# Patient Record
Sex: MALE | Race: OTHER | HISPANIC OR LATINO
[De-identification: names, ages, dates, MRNs, and addresses within clinical notes are randomized per-mention and may not be internally consistent; named-entity substitution may affect disease eponyms.]

---

## 2017-01-26 ENCOUNTER — RECORD ABSTRACTING (OUTPATIENT)
Age: 72
End: 2017-01-26

## 2017-01-26 DIAGNOSIS — Z78.9 OTHER SPECIFIED HEALTH STATUS: ICD-10-CM

## 2017-01-26 DIAGNOSIS — K52.9 NONINFECTIVE GASTROENTERITIS AND COLITIS, UNSPECIFIED: ICD-10-CM

## 2017-01-26 DIAGNOSIS — K57.90 DIVERTICULOSIS OF INTESTINE, PART UNSPECIFIED, W/OUT PERFORATION OR ABSCESS W/OUT BLEEDING: ICD-10-CM

## 2017-01-26 PROBLEM — Z00.00 ENCOUNTER FOR PREVENTIVE HEALTH EXAMINATION: Status: ACTIVE | Noted: 2017-01-26

## 2022-07-01 ENCOUNTER — INPATIENT (INPATIENT)
Facility: HOSPITAL | Age: 77
LOS: 5 days | Discharge: HOME HEALTH | End: 2022-07-07
Attending: INTERNAL MEDICINE | Admitting: INTERNAL MEDICINE

## 2022-07-01 VITALS
TEMPERATURE: 98 F | HEART RATE: 75 BPM | HEIGHT: 62 IN | WEIGHT: 160.06 LBS | SYSTOLIC BLOOD PRESSURE: 170 MMHG | OXYGEN SATURATION: 98 % | RESPIRATION RATE: 18 BRPM | DIASTOLIC BLOOD PRESSURE: 84 MMHG

## 2022-07-01 PROCEDURE — 99285 EMERGENCY DEPT VISIT HI MDM: CPT

## 2022-07-01 RX ORDER — TETANUS TOXOID, REDUCED DIPHTHERIA TOXOID AND ACELLULAR PERTUSSIS VACCINE, ADSORBED 5; 2.5; 8; 8; 2.5 [IU]/.5ML; [IU]/.5ML; UG/.5ML; UG/.5ML; UG/.5ML
0.5 SUSPENSION INTRAMUSCULAR ONCE
Refills: 0 | Status: COMPLETED | OUTPATIENT
Start: 2022-07-01 | End: 2022-07-01

## 2022-07-02 LAB
A1C WITH ESTIMATED AVERAGE GLUCOSE RESULT: 8.1 % — HIGH (ref 4–5.6)
A1C WITH ESTIMATED AVERAGE GLUCOSE RESULT: 8.2 % — HIGH (ref 4–5.6)
ALBUMIN SERPL ELPH-MCNC: 4.5 G/DL — SIGNIFICANT CHANGE UP (ref 3.5–5.2)
ALP SERPL-CCNC: 115 U/L — SIGNIFICANT CHANGE UP (ref 30–115)
ALT FLD-CCNC: 37 U/L — SIGNIFICANT CHANGE UP (ref 0–41)
ANION GAP SERPL CALC-SCNC: 10 MMOL/L — SIGNIFICANT CHANGE UP (ref 7–14)
ANION GAP SERPL CALC-SCNC: 13 MMOL/L — SIGNIFICANT CHANGE UP (ref 7–14)
APTT BLD: 32.5 SEC — SIGNIFICANT CHANGE UP (ref 27–39.2)
AST SERPL-CCNC: 38 U/L — SIGNIFICANT CHANGE UP (ref 0–41)
BASOPHILS # BLD AUTO: 0.05 K/UL — SIGNIFICANT CHANGE UP (ref 0–0.2)
BASOPHILS NFR BLD AUTO: 1 % — SIGNIFICANT CHANGE UP (ref 0–1)
BILIRUB SERPL-MCNC: 0.7 MG/DL — SIGNIFICANT CHANGE UP (ref 0.2–1.2)
BLD GP AB SCN SERPL QL: SIGNIFICANT CHANGE UP
BUN SERPL-MCNC: 14 MG/DL — SIGNIFICANT CHANGE UP (ref 10–20)
BUN SERPL-MCNC: 21 MG/DL — HIGH (ref 10–20)
CALCIUM SERPL-MCNC: 8.7 MG/DL — SIGNIFICANT CHANGE UP (ref 8.5–10.1)
CALCIUM SERPL-MCNC: 8.8 MG/DL — SIGNIFICANT CHANGE UP (ref 8.5–10.1)
CHLORIDE SERPL-SCNC: 97 MMOL/L — LOW (ref 98–110)
CHLORIDE SERPL-SCNC: 99 MMOL/L — SIGNIFICANT CHANGE UP (ref 98–110)
CO2 SERPL-SCNC: 25 MMOL/L — SIGNIFICANT CHANGE UP (ref 17–32)
CO2 SERPL-SCNC: 29 MMOL/L — SIGNIFICANT CHANGE UP (ref 17–32)
CREAT SERPL-MCNC: 0.7 MG/DL — SIGNIFICANT CHANGE UP (ref 0.7–1.5)
CREAT SERPL-MCNC: 0.8 MG/DL — SIGNIFICANT CHANGE UP (ref 0.7–1.5)
CRP SERPL-MCNC: 16 MG/L — HIGH
EGFR: 92 ML/MIN/1.73M2 — SIGNIFICANT CHANGE UP
EGFR: 95 ML/MIN/1.73M2 — SIGNIFICANT CHANGE UP
EOSINOPHIL # BLD AUTO: 0.24 K/UL — SIGNIFICANT CHANGE UP (ref 0–0.7)
EOSINOPHIL NFR BLD AUTO: 4.6 % — SIGNIFICANT CHANGE UP (ref 0–8)
ERYTHROCYTE [SEDIMENTATION RATE] IN BLOOD: 20 MM/HR — HIGH (ref 0–10)
ESTIMATED AVERAGE GLUCOSE: 186 MG/DL — HIGH (ref 68–114)
ESTIMATED AVERAGE GLUCOSE: 189 MG/DL — HIGH (ref 68–114)
GLUCOSE BLDC GLUCOMTR-MCNC: 125 MG/DL — HIGH (ref 70–99)
GLUCOSE BLDC GLUCOMTR-MCNC: 175 MG/DL — HIGH (ref 70–99)
GLUCOSE BLDC GLUCOMTR-MCNC: 188 MG/DL — HIGH (ref 70–99)
GLUCOSE SERPL-MCNC: 201 MG/DL — HIGH (ref 70–99)
GLUCOSE SERPL-MCNC: 341 MG/DL — HIGH (ref 70–99)
HCT VFR BLD CALC: 37.4 % — LOW (ref 42–52)
HCT VFR BLD CALC: 38.4 % — LOW (ref 42–52)
HGB BLD-MCNC: 13.2 G/DL — LOW (ref 14–18)
HGB BLD-MCNC: 13.7 G/DL — LOW (ref 14–18)
IMM GRANULOCYTES NFR BLD AUTO: 1.3 % — HIGH (ref 0.1–0.3)
INR BLD: 1.12 RATIO — SIGNIFICANT CHANGE UP (ref 0.65–1.3)
LYMPHOCYTES # BLD AUTO: 0.83 K/UL — LOW (ref 1.2–3.4)
LYMPHOCYTES # BLD AUTO: 15.8 % — LOW (ref 20.5–51.1)
MAGNESIUM SERPL-MCNC: 1.9 MG/DL — SIGNIFICANT CHANGE UP (ref 1.8–2.4)
MCHC RBC-ENTMCNC: 35 PG — HIGH (ref 27–31)
MCHC RBC-ENTMCNC: 35 PG — HIGH (ref 27–31)
MCHC RBC-ENTMCNC: 35.3 G/DL — SIGNIFICANT CHANGE UP (ref 32–37)
MCHC RBC-ENTMCNC: 35.7 G/DL — SIGNIFICANT CHANGE UP (ref 32–37)
MCV RBC AUTO: 98.2 FL — HIGH (ref 80–94)
MCV RBC AUTO: 99.2 FL — HIGH (ref 80–94)
MONOCYTES # BLD AUTO: 0.87 K/UL — HIGH (ref 0.1–0.6)
MONOCYTES NFR BLD AUTO: 16.6 % — HIGH (ref 1.7–9.3)
NEUTROPHILS # BLD AUTO: 3.18 K/UL — SIGNIFICANT CHANGE UP (ref 1.4–6.5)
NEUTROPHILS NFR BLD AUTO: 60.7 % — SIGNIFICANT CHANGE UP (ref 42.2–75.2)
NRBC # BLD: 0 /100 WBCS — SIGNIFICANT CHANGE UP (ref 0–0)
NRBC # BLD: 0 /100 WBCS — SIGNIFICANT CHANGE UP (ref 0–0)
PLATELET # BLD AUTO: 182 K/UL — SIGNIFICANT CHANGE UP (ref 130–400)
PLATELET # BLD AUTO: 192 K/UL — SIGNIFICANT CHANGE UP (ref 130–400)
POTASSIUM SERPL-MCNC: 3.9 MMOL/L — SIGNIFICANT CHANGE UP (ref 3.5–5)
POTASSIUM SERPL-MCNC: 3.9 MMOL/L — SIGNIFICANT CHANGE UP (ref 3.5–5)
POTASSIUM SERPL-SCNC: 3.9 MMOL/L — SIGNIFICANT CHANGE UP (ref 3.5–5)
POTASSIUM SERPL-SCNC: 3.9 MMOL/L — SIGNIFICANT CHANGE UP (ref 3.5–5)
PROT SERPL-MCNC: 7.1 G/DL — SIGNIFICANT CHANGE UP (ref 6–8)
PROTHROM AB SERPL-ACNC: 12.9 SEC — HIGH (ref 9.95–12.87)
RBC # BLD: 3.77 M/UL — LOW (ref 4.7–6.1)
RBC # BLD: 3.91 M/UL — LOW (ref 4.7–6.1)
RBC # FLD: 12.2 % — SIGNIFICANT CHANGE UP (ref 11.5–14.5)
RBC # FLD: 12.3 % — SIGNIFICANT CHANGE UP (ref 11.5–14.5)
SARS-COV-2 RNA SPEC QL NAA+PROBE: SIGNIFICANT CHANGE UP
SODIUM SERPL-SCNC: 135 MMOL/L — SIGNIFICANT CHANGE UP (ref 135–146)
SODIUM SERPL-SCNC: 138 MMOL/L — SIGNIFICANT CHANGE UP (ref 135–146)
WBC # BLD: 5.24 K/UL — SIGNIFICANT CHANGE UP (ref 4.8–10.8)
WBC # BLD: 7.26 K/UL — SIGNIFICANT CHANGE UP (ref 4.8–10.8)
WBC # FLD AUTO: 5.24 K/UL — SIGNIFICANT CHANGE UP (ref 4.8–10.8)
WBC # FLD AUTO: 7.26 K/UL — SIGNIFICANT CHANGE UP (ref 4.8–10.8)

## 2022-07-02 PROCEDURE — 99497 ADVNCD CARE PLAN 30 MIN: CPT | Mod: 25

## 2022-07-02 PROCEDURE — 73552 X-RAY EXAM OF FEMUR 2/>: CPT | Mod: 26,RT

## 2022-07-02 PROCEDURE — 99223 1ST HOSP IP/OBS HIGH 75: CPT

## 2022-07-02 PROCEDURE — 73590 X-RAY EXAM OF LOWER LEG: CPT | Mod: 26,RT

## 2022-07-02 PROCEDURE — 73630 X-RAY EXAM OF FOOT: CPT | Mod: 26,RT

## 2022-07-02 PROCEDURE — 73610 X-RAY EXAM OF ANKLE: CPT | Mod: 26,RT

## 2022-07-02 PROCEDURE — 73562 X-RAY EXAM OF KNEE 3: CPT | Mod: 26,RT

## 2022-07-02 PROCEDURE — 73706 CT ANGIO LWR EXTR W/O&W/DYE: CPT | Mod: 26,RT,MA

## 2022-07-02 RX ORDER — DEXTROSE 50 % IN WATER 50 %
12.5 SYRINGE (ML) INTRAVENOUS ONCE
Refills: 0 | Status: DISCONTINUED | OUTPATIENT
Start: 2022-07-02 | End: 2022-07-05

## 2022-07-02 RX ORDER — FINASTERIDE 5 MG/1
5 TABLET, FILM COATED ORAL DAILY
Refills: 0 | Status: DISCONTINUED | OUTPATIENT
Start: 2022-07-02 | End: 2022-07-07

## 2022-07-02 RX ORDER — INSULIN LISPRO 100/ML
VIAL (ML) SUBCUTANEOUS
Refills: 0 | Status: DISCONTINUED | OUTPATIENT
Start: 2022-07-02 | End: 2022-07-05

## 2022-07-02 RX ORDER — CEFAZOLIN SODIUM 1 G
500 VIAL (EA) INJECTION EVERY 8 HOURS
Refills: 0 | Status: DISCONTINUED | OUTPATIENT
Start: 2022-07-02 | End: 2022-07-02

## 2022-07-02 RX ORDER — ONDANSETRON 8 MG/1
4 TABLET, FILM COATED ORAL EVERY 8 HOURS
Refills: 0 | Status: DISCONTINUED | OUTPATIENT
Start: 2022-07-02 | End: 2022-07-07

## 2022-07-02 RX ORDER — LANOLIN ALCOHOL/MO/W.PET/CERES
3 CREAM (GRAM) TOPICAL AT BEDTIME
Refills: 0 | Status: DISCONTINUED | OUTPATIENT
Start: 2022-07-02 | End: 2022-07-07

## 2022-07-02 RX ORDER — DEXTROSE 50 % IN WATER 50 %
25 SYRINGE (ML) INTRAVENOUS ONCE
Refills: 0 | Status: DISCONTINUED | OUTPATIENT
Start: 2022-07-02 | End: 2022-07-05

## 2022-07-02 RX ORDER — ACETAMINOPHEN 500 MG
975 TABLET ORAL ONCE
Refills: 0 | Status: COMPLETED | OUTPATIENT
Start: 2022-07-02 | End: 2022-07-02

## 2022-07-02 RX ORDER — METRONIDAZOLE 500 MG
500 TABLET ORAL ONCE
Refills: 0 | Status: COMPLETED | OUTPATIENT
Start: 2022-07-02 | End: 2022-07-02

## 2022-07-02 RX ORDER — CEFAZOLIN SODIUM 1 G
1000 VIAL (EA) INJECTION EVERY 8 HOURS
Refills: 0 | Status: DISCONTINUED | OUTPATIENT
Start: 2022-07-02 | End: 2022-07-07

## 2022-07-02 RX ORDER — HEPARIN SODIUM 5000 [USP'U]/ML
5000 INJECTION INTRAVENOUS; SUBCUTANEOUS EVERY 8 HOURS
Refills: 0 | Status: DISCONTINUED | OUTPATIENT
Start: 2022-07-02 | End: 2022-07-07

## 2022-07-02 RX ORDER — ATORVASTATIN CALCIUM 80 MG/1
10 TABLET, FILM COATED ORAL AT BEDTIME
Refills: 0 | Status: DISCONTINUED | OUTPATIENT
Start: 2022-07-02 | End: 2022-07-07

## 2022-07-02 RX ORDER — SODIUM CHLORIDE 9 MG/ML
1000 INJECTION, SOLUTION INTRAVENOUS
Refills: 0 | Status: DISCONTINUED | OUTPATIENT
Start: 2022-07-02 | End: 2022-07-05

## 2022-07-02 RX ORDER — ACETAMINOPHEN 500 MG
650 TABLET ORAL EVERY 6 HOURS
Refills: 0 | Status: DISCONTINUED | OUTPATIENT
Start: 2022-07-02 | End: 2022-07-07

## 2022-07-02 RX ORDER — DEXTROSE 50 % IN WATER 50 %
15 SYRINGE (ML) INTRAVENOUS ONCE
Refills: 0 | Status: DISCONTINUED | OUTPATIENT
Start: 2022-07-02 | End: 2022-07-05

## 2022-07-02 RX ORDER — CEFTRIAXONE 500 MG/1
2000 INJECTION, POWDER, FOR SOLUTION INTRAMUSCULAR; INTRAVENOUS ONCE
Refills: 0 | Status: COMPLETED | OUTPATIENT
Start: 2022-07-02 | End: 2022-07-02

## 2022-07-02 RX ORDER — TAMSULOSIN HYDROCHLORIDE 0.4 MG/1
0.4 CAPSULE ORAL AT BEDTIME
Refills: 0 | Status: DISCONTINUED | OUTPATIENT
Start: 2022-07-02 | End: 2022-07-07

## 2022-07-02 RX ORDER — LISINOPRIL 2.5 MG/1
10 TABLET ORAL DAILY
Refills: 0 | Status: DISCONTINUED | OUTPATIENT
Start: 2022-07-02 | End: 2022-07-07

## 2022-07-02 RX ORDER — INSULIN GLARGINE 100 [IU]/ML
15 INJECTION, SOLUTION SUBCUTANEOUS AT BEDTIME
Refills: 0 | Status: DISCONTINUED | OUTPATIENT
Start: 2022-07-02 | End: 2022-07-05

## 2022-07-02 RX ORDER — GLUCAGON INJECTION, SOLUTION 0.5 MG/.1ML
1 INJECTION, SOLUTION SUBCUTANEOUS ONCE
Refills: 0 | Status: DISCONTINUED | OUTPATIENT
Start: 2022-07-02 | End: 2022-07-05

## 2022-07-02 RX ORDER — INSULIN LISPRO 100/ML
5 VIAL (ML) SUBCUTANEOUS
Refills: 0 | Status: DISCONTINUED | OUTPATIENT
Start: 2022-07-02 | End: 2022-07-05

## 2022-07-02 RX ORDER — TRIAMTERENE/HYDROCHLOROTHIAZID 75 MG-50MG
1 TABLET ORAL DAILY
Refills: 0 | Status: DISCONTINUED | OUTPATIENT
Start: 2022-07-02 | End: 2022-07-07

## 2022-07-02 RX ORDER — TETANUS TOXOID, REDUCED DIPHTHERIA TOXOID AND ACELLULAR PERTUSSIS VACCINE, ADSORBED 5; 2.5; 8; 8; 2.5 [IU]/.5ML; [IU]/.5ML; UG/.5ML; UG/.5ML; UG/.5ML
0.5 SUSPENSION INTRAMUSCULAR ONCE
Refills: 0 | Status: DISCONTINUED | OUTPATIENT
Start: 2022-07-02 | End: 2022-07-02

## 2022-07-02 RX ADMIN — Medication 1: at 17:11

## 2022-07-02 RX ADMIN — HEPARIN SODIUM 5000 UNIT(S): 5000 INJECTION INTRAVENOUS; SUBCUTANEOUS at 22:10

## 2022-07-02 RX ADMIN — Medication 100 MILLIGRAM(S): at 03:35

## 2022-07-02 RX ADMIN — ATORVASTATIN CALCIUM 10 MILLIGRAM(S): 80 TABLET, FILM COATED ORAL at 22:11

## 2022-07-02 RX ADMIN — HEPARIN SODIUM 5000 UNIT(S): 5000 INJECTION INTRAVENOUS; SUBCUTANEOUS at 17:14

## 2022-07-02 RX ADMIN — Medication 1: at 12:00

## 2022-07-02 RX ADMIN — Medication 650 MILLIGRAM(S): at 14:37

## 2022-07-02 RX ADMIN — INSULIN GLARGINE 15 UNIT(S): 100 INJECTION, SOLUTION SUBCUTANEOUS at 22:11

## 2022-07-02 RX ADMIN — TETANUS TOXOID, REDUCED DIPHTHERIA TOXOID AND ACELLULAR PERTUSSIS VACCINE, ADSORBED 0.5 MILLILITER(S): 5; 2.5; 8; 8; 2.5 SUSPENSION INTRAMUSCULAR at 01:35

## 2022-07-02 RX ADMIN — Medication 5 UNIT(S): at 12:00

## 2022-07-02 RX ADMIN — TAMSULOSIN HYDROCHLORIDE 0.4 MILLIGRAM(S): 0.4 CAPSULE ORAL at 22:10

## 2022-07-02 RX ADMIN — Medication 100 MILLIGRAM(S): at 07:03

## 2022-07-02 RX ADMIN — Medication 650 MILLIGRAM(S): at 15:14

## 2022-07-02 RX ADMIN — Medication 975 MILLIGRAM(S): at 02:10

## 2022-07-02 RX ADMIN — CEFTRIAXONE 100 MILLIGRAM(S): 500 INJECTION, POWDER, FOR SOLUTION INTRAMUSCULAR; INTRAVENOUS at 03:35

## 2022-07-02 RX ADMIN — FINASTERIDE 5 MILLIGRAM(S): 5 TABLET, FILM COATED ORAL at 17:13

## 2022-07-02 RX ADMIN — Medication 5 UNIT(S): at 17:21

## 2022-07-02 RX ADMIN — Medication 975 MILLIGRAM(S): at 01:35

## 2022-07-02 RX ADMIN — Medication 100 MILLIGRAM(S): at 22:10

## 2022-07-02 RX ADMIN — Medication 100 MILLIGRAM(S): at 14:02

## 2022-07-02 NOTE — CONSULT NOTE ADULT - SUBJECTIVE AND OBJECTIVE BOX
SHAILA LAL  76y, Male  Allergy: No Known Allergies      CHIEF COMPLAINT: Cellulitis (02 Jul 2022 13:55)      LOS      HPI:  77 yo male   PMH :  pre-diabetes , HT   Presenting for R foot pain that has been constant for the past 3 days.   States he fell on the sidewalk and injured his R knee and foot 3 days ago. It was a mechanical fall with no LOC, dizziness   Went to an ED at [facility] in [facility], had xrays done at the time, states they were negative and was given oral antibiotics  States that the swelling and discoloration to the foot have worsened since then.   Denies : fevers, previous infections, hx of known vascular disease    In the ED  Bp : 170/85  HR : 75   RR: 18  T : 97.9  O2 : 98% on RA    Gluc : 341 , WBC 5    CT Angio LE R :   Intact arterial flow/triple-vessel runoff to the right lower extremity  without evidence for significant stenosis or acute injury.  Diffuse subcutaneous soft tissue edema and areas of skin thickening  extending from the level of the distal femur through the foot - possible cellulitis   Focal 5.0 x 1.57 m hyperdense attenuation over the dorsum mid to hindfoot - possible hematoma   No definite acute osseous abnormality is identified.    Started on Ancef 1g q8 in ED    Patient pharmacy : [facility] [address] [facility] : closed on the weekend , reopens Monday 9am-6pm ([phone])  Please call to confirm med recc : Per patient he takes 1bp med and 1 water pill, can not remember the names          (02 Jul 2022 10:13)      INFECTIOUS DISEASE HISTORY:    PAST MEDICAL & SURGICAL HISTORY:  Prediabetes          FAMILY HISTORY  History as above     SOCIAL HISTORY  Social History:  No smoking   No Alcohol use   No illicit drugs (02 Jul 2022 10:13)        ROS  General: Denies rigors, nightsweats  HEENT: Denies headache, rhinorrhea, sore throat, eye pain  CV: Denies CP, palpitations  PULM: Denies wheezing, hemoptysis  GI: Denies hematemesis, hematochezia, melena  : Denies discharge, hematuria  MSK: Denies arthralgias, myalgias  SKIN: Denies rash, lesions  NEURO: Denies paresthesias, weakness  PSYCH: Denies depression, anxiety    VITALS:  T(F): 99.3, Max: 99.3 (07-02-22 @ 12:10)  HR: 68  BP: 153/73  RR: 18Vital Signs Last 24 Hrs  T(C): 37.4 (02 Jul 2022 12:10), Max: 37.4 (02 Jul 2022 12:10)  T(F): 99.3 (02 Jul 2022 12:10), Max: 99.3 (02 Jul 2022 12:10)  HR: 68 (02 Jul 2022 12:10) (63 - 75)  BP: 153/73 (02 Jul 2022 12:10) (132/73 - 170/84)  BP(mean): --  RR: 18 (02 Jul 2022 12:10) (18 - 18)  SpO2: 98% (02 Jul 2022 03:19) (98% - 98%)    PHYSICAL EXAM:  Gen: NAD, resting in bed  HEENT: Normocephalic, atraumatic  Neck: supple, no lymphadenopathy  CV: Regular rate & regular rhythm  Lungs: decreased BS at bases, no fremitus  Abdomen: Soft, BS present  Ext: Warm, well perfused  Neuro: non focal, awake  Skin: no rash, no erythema  Lines: no phlebitis    TESTS & MEASUREMENTS:                        13.7   7.26  )-----------( 192      ( 02 Jul 2022 12:04 )             38.4     07-02    138  |  99  |  14  ----------------------------<  201<H>  3.9   |  29  |  0.7    Ca    8.8      02 Jul 2022 12:04  Mg     1.9     07-02    TPro  7.1  /  Alb  4.5  /  TBili  0.7  /  DBili  x   /  AST  38  /  ALT  37  /  AlkPhos  115  07-02      LIVER FUNCTIONS - ( 02 Jul 2022 00:26 )  Alb: 4.5 g/dL / Pro: 7.1 g/dL / ALK PHOS: 115 U/L / ALT: 37 U/L / AST: 38 U/L / GGT: x                     INFECTIOUS DISEASES TESTING  COVID-19 PCR: NotDetec (07-02-22 @ 02:49)      RADIOLOGY & ADDITIONAL TESTS:  I have personally reviewed the last Chest xray  CXR      CT      CARDIOLOGY TESTING      MEDICATIONS  atorvastatin 10 Oral at bedtime  ceFAZolin   IVPB 1000 IV Intermittent every 8 hours  dextrose 5%. 1000 IV Continuous <Continuous>  dextrose 5%. 1000 IV Continuous <Continuous>  dextrose 50% Injectable 25 IV Push once  dextrose 50% Injectable 12.5 IV Push once  dextrose 50% Injectable 25 IV Push once  finasteride 5 Oral daily  glucagon  Injectable 1 IntraMuscular once  heparin   Injectable 5000 SubCutaneous every 8 hours  insulin glargine Injectable (LANTUS) 15 SubCutaneous at bedtime  insulin lispro (ADMELOG) corrective regimen sliding scale  SubCutaneous three times a day before meals  insulin lispro Injectable (ADMELOG) 5 SubCutaneous three times a day before meals  lisinopril 10 Oral daily  tamsulosin 0.4 Oral at bedtime  triamterene 37.5 mG/hydrochlorothiazide 25 mG Tablet 1 Oral daily      Weight  Weight (kg): 72.6 (07-01-22 @ 22:14)    ANTIBIOTICS:  ceFAZolin   IVPB 1000 milliGRAM(s) IV Intermittent every 8 hours      ALLERGIES:  No Known Allergies         HALI SHAILA  76y, Male  Allergy: No Known Allergies      CHIEF COMPLAINT: Cellulitis (02 Jul 2022 13:55)      LOS      HPI:  77 yo male   PMH :  pre-diabetes , HT   Presenting for R foot pain that has been constant for the past 3 days.   States he fell on the sidewalk and injured his R knee and foot 3 days ago. It was a mechanical fall with no LOC, dizziness   Went to an ED at [facility] in [facility], had xrays done at the time, states they were negative and was given oral antibiotics  States that the swelling and discoloration to the foot have worsened since then.   Denies : fevers, previous infections, hx of known vascular disease    In the ED  Bp : 170/85  HR : 75   RR: 18  T : 97.9  O2 : 98% on RA    Gluc : 341 , WBC 5    CT Angio LE R :   Intact arterial flow/triple-vessel runoff to the right lower extremity  without evidence for significant stenosis or acute injury.  Diffuse subcutaneous soft tissue edema and areas of skin thickening  extending from the level of the distal femur through the foot - possible cellulitis   Focal 5.0 x 1.57 m hyperdense attenuation over the dorsum mid to hindfoot - possible hematoma   No definite acute osseous abnormality is identified.    Started on Ancef 1g q8 in ED    Patient pharmacy : [facility] [address] [facility] : closed on the weekend , reopens Monday 9am-6pm ([phone])  Please call to confirm med recc : Per patient he takes 1bp med and 1 water pill, can not remember the names          (02 Jul 2022 10:13)      INFECTIOUS DISEASE HISTORY:  History as above.   Reports that recently at [facility] for similar issue.   He was given short course of antibiotics.,  Presents with worsening swelling/pain     PAST MEDICAL & SURGICAL HISTORY:  Prediabetes          FAMILY HISTORY  History as above     SOCIAL HISTORY  Social History:  No smoking   No Alcohol use   No illicit drugs (02 Jul 2022 10:13)        ROS  General: Denies rigors, nightsweats  HEENT: Denies headache, rhinorrhea, sore throat, eye pain  CV: Denies CP, palpitations  PULM: Denies wheezing, hemoptysis  GI: Denies hematemesis, hematochezia, melena  : Denies discharge, hematuria  MSK: Denies arthralgias, myalgias  SKIN: Denies rash, lesions  NEURO: Denies paresthesias, weakness  PSYCH: Denies depression, anxiety    VITALS:  T(F): 99.3, Max: 99.3 (07-02-22 @ 12:10)  HR: 68  BP: 153/73  RR: 18Vital Signs Last 24 Hrs  T(C): 37.4 (02 Jul 2022 12:10), Max: 37.4 (02 Jul 2022 12:10)  T(F): 99.3 (02 Jul 2022 12:10), Max: 99.3 (02 Jul 2022 12:10)  HR: 68 (02 Jul 2022 12:10) (63 - 75)  BP: 153/73 (02 Jul 2022 12:10) (132/73 - 170/84)  BP(mean): --  RR: 18 (02 Jul 2022 12:10) (18 - 18)  SpO2: 98% (02 Jul 2022 03:19) (98% - 98%)    PHYSICAL EXAM:  Gen: NAD, resting in bed  HEENT: Normocephalic, atraumatic  Neck: supple, no lymphadenopathy  CV: Regular rate & regular rhythm  Lungs: decreased BS at bases, no fremitus  Abdomen: Soft, BS present  Ext: Warm, well perfused: RLE with brusing up lateral leg - dark/purpuble with likely blood filled blisters at dorsal, lateral aspect of foot, edematous   Neuro: non focal, awake  Skin: no rash, no erythema  Lines: no phlebitis    TESTS & MEASUREMENTS:                        13.7   7.26  )-----------( 192      ( 02 Jul 2022 12:04 )             38.4     07-02    138  |  99  |  14  ----------------------------<  201<H>  3.9   |  29  |  0.7    Ca    8.8      02 Jul 2022 12:04  Mg     1.9     07-02    TPro  7.1  /  Alb  4.5  /  TBili  0.7  /  DBili  x   /  AST  38  /  ALT  37  /  AlkPhos  115  07-02      LIVER FUNCTIONS - ( 02 Jul 2022 00:26 )  Alb: 4.5 g/dL / Pro: 7.1 g/dL / ALK PHOS: 115 U/L / ALT: 37 U/L / AST: 38 U/L / GGT: x                     INFECTIOUS DISEASES TESTING  COVID-19 PCR: NotDetec (07-02-22 @ 02:49)      RADIOLOGY & ADDITIONAL TESTS:  I have personally reviewed the last Chest xray  CXR      CT      CARDIOLOGY TESTING      MEDICATIONS  atorvastatin 10 Oral at bedtime  ceFAZolin   IVPB 1000 IV Intermittent every 8 hours  dextrose 5%. 1000 IV Continuous <Continuous>  dextrose 5%. 1000 IV Continuous <Continuous>  dextrose 50% Injectable 25 IV Push once  dextrose 50% Injectable 12.5 IV Push once  dextrose 50% Injectable 25 IV Push once  finasteride 5 Oral daily  glucagon  Injectable 1 IntraMuscular once  heparin   Injectable 5000 SubCutaneous every 8 hours  insulin glargine Injectable (LANTUS) 15 SubCutaneous at bedtime  insulin lispro (ADMELOG) corrective regimen sliding scale  SubCutaneous three times a day before meals  insulin lispro Injectable (ADMELOG) 5 SubCutaneous three times a day before meals  lisinopril 10 Oral daily  tamsulosin 0.4 Oral at bedtime  triamterene 37.5 mG/hydrochlorothiazide 25 mG Tablet 1 Oral daily      Weight  Weight (kg): 72.6 (07-01-22 @ 22:14)    ANTIBIOTICS:  ceFAZolin   IVPB 1000 milliGRAM(s) IV Intermittent every 8 hours      ALLERGIES:  No Known Allergies

## 2022-07-02 NOTE — H&P ADULT - NSHPLABSRESULTS_GEN_ALL_CORE
(07-02 @ 00:26)                      13.2  5.24 )-----------( 182                 37.4    Neutrophils = 3.18 (60.7%)  Lymphocytes = 0.83 (15.8%)  Eosinophils = 0.24 (4.6%)  Basophils = 0.05 (1.0%)  Monocytes = 0.87 (16.6%)  Bands = --%    07-02    135  |  97<L>  |  21<H>  ----------------------------<  341<H>  3.9   |  25  |  0.8    Ca    8.7      02 Jul 2022 00:26    TPro  7.1  /  Alb  4.5  /  TBili  0.7  /  DBili  x   /  AST  38  /  ALT  37  /  AlkPhos  115  07-02    ( 02 Jul 2022 00:26 )   PT: 12.90 sec;   INR: 1.12 ratio;
Negative

## 2022-07-02 NOTE — ED PROVIDER NOTE - PHYSICAL EXAMINATION
GEN: Non toxic appearing, pt sitting on stretcher in nad.  HEAD: Normocephalic, atraumatic.  Integumentary: No rash. No laceration. R dorsal foot swelling, ecchymoses, superficial lateral abrasions and dorsal abrasion. Bluish discoloration to R foot. DP and PT pulses dopplerable. R lateral thigh and R lateral knee with ecchymoses.  EYES: No periorbital swelling/ecchymoses. PERRRL, EOM intact. No nystagmus. No subconjunctival hemorrhage.   ENT: MMM. No rhinorrhea/otorrhea. No epistaxis,  No septal hematoma. No mastoid ecchymoses. No intraoral bleeding, No loose or cracked teeth, no active bleeding. No malocclusion. No TMJ pain.  NECK: Supple, non-tender, No palpable shelves or step-offs.  BACK: No spinous tenderness. No palpable shelves or step-offs.  Cardiovascular: RRR, radial pulses 2/4 b/l. dp and pt pulses 2/4/ b/l. No pain to palpation to chest wall.  Respiratory: BS present b/l, ctabl, no wheezing or crackles, no stridor. No pain to palpation to ribs b/l. No crepitus. No subq emphysema.   Gastrointestinal: BS present throughout all 4 quadrants, soft, nd, nt. no r/g.  Musculoskeletal: R dorsal foot pain with decreased ROM of R ankle in all motions due to pain, no pain to palpation to R  knee, fibular head, patella, femur, base of metatarsal or tib fib with FROM of knee, hip and toes.   Neurologic: GCS 15. CN II-XII intact, no facial droop or slurring of speech. Motor 5/5 and sensation intact throughout upper and lower extremities.

## 2022-07-02 NOTE — H&P ADULT - NSHPREVIEWOFSYSTEMS_GEN_ALL_CORE

## 2022-07-02 NOTE — H&P ADULT - ATTENDING COMMENTS
76 Y M with pmh of pre-DM, HTN, BPH presents to ED after hurting his right foot on Monday. He says he was walking and tripped, rolled his right ankle/foot, and since then has had significant pain and swelling that is worsening. He went to [facility] in [place], had xrays done (which he says were negative), and given oral antibiotics. However, has had worsening pain and swelling, now with worsening bruising and inability to ambulate. Denies fevers, previous infections, loss of sensation.     #RLE cellulitis, now with bullae and significant soft tissue swelling   -SIRS not present on admission  -continue ancef 1g iv q8h  -awaiting ID consult  -tylenol prn for pain  -distal pulses present  -agree with burn consult   -va duplex RLE venous     # DM - uncontrolled  - f/u finger sticks   - f/u A1c  - home on metformin 1000 qD   - insulin naive   - started on glargine 15 qhs , lispro 5 with meals + SS (low)    # HT   - c/w home hctz 37.5/tramptorere 25 PO qD  - c/w home lisinopril 10 qD    # HLD  - c/w home atorvastatin 10 at bed time     # BPH   - c/w home flomax and finasteride    #DVT PPx- LVX 40mg sq qhs  #GI PPx- None   #Diet- DASH/TLC/CC  #CHG  #Activity- AAT; PT/Rehab   #Dispo- Acute; pending burn/ID consults, improvement of swelling, PT/rehab  #Code- FULL 76 Y M with pmh of pre-DM, HTN, BPH presents to ED after hurting his right foot on Monday. He says he was walking and tripped, rolled his right ankle/foot, and since then has had significant pain and swelling that is worsening. He went to [facility] in [place], had xrays done (which he says were negative), and given oral antibiotics. However, has had worsening pain and swelling, now with worsening bruising and inability to ambulate. Denies fevers, previous infections, loss of sensation.     #RLE cellulitis, now with bullae and significant soft tissue swelling   -SIRS not present on admission  -continue ancef 1g iv q8h  -awaiting ID consult  -tylenol prn for pain  -distal pulses present  -agree with burn consult   -va duplex RLE venous   -send a1c    # DM - uncontrolled  - f/u finger sticks   - f/u A1c  - home on metformin 1000 qD   - insulin naive   - started on glargine 15 qhs , lispro 5 with meals + SS (low)    # HT   - c/w home hctz 37.5/tramptorere 25 PO qD  - c/w home lisinopril 10 qD    # HLD  - c/w home atorvastatin 10 at bed time     # BPH   - c/w home flomax and finasteride    #DVT PPx- LVX 40mg sq qhs  #GI PPx- None   #Diet- DASH/TLC/CC  #CHG  #Activity- AAT; PT/Rehab   #Dispo- Acute; pending burn/ID consults, improvement of swelling, PT/rehab  #Code- FULL

## 2022-07-02 NOTE — PATIENT PROFILE ADULT - 
ADDITIONAL INFORMATION
-Using the intrrrepter [id number] [place] this nurse was told that patient do not know which brand of vaccine he had taken, He received three doses of the vaccine, the last was in february 2021. He does not have the card with him

## 2022-07-02 NOTE — CONSULT NOTE ADULT - ASSESSMENT
ASSESSMENT  77 yo male with PMH of pre-diabetes, HTN who presents with fight foot pain.     IMPRESSION  #Right LE cellulitis - SIRS not present on admission   - CT Angio Lower Extremity w/ IV Cont, Right (07.02.22 @ 01:50): Diffuse subcutaneous soft tissue edema and areas of skin thickening  extending from the level of the distal femur through the foot with more   focal 5.0 x 1.57 m hyperdense attenuation over the dorsum mid to hindfoot  which may reflect underlying hematoma. Clinical correlation would be  needed to exclude any signs of cellulitis or infection. No definite acute osseous abnormality is identified.  - WBC Count: 5.24 K/uL (07.02.22 @ 00:26)    #Abx allergy: NKDA    RECOMMENDATIONS  This is a preliminary incomplete pended note, all final recommendations to follow after interview and examination of the patient.    Please call or message on [employer] Teams if with any questions.  Spectra [phone]   ASSESSMENT  77 yo male with PMH of pre-diabetes, HTN who presents with fight foot pain.     IMPRESSION  #Right LE cellulitis with hematoma - SIRS not present on admission   - CT Angio Lower Extremity w/ IV Cont, Right (07.02.22 @ 01:50): Diffuse subcutaneous soft tissue edema and areas of skin thickening  extending from the level of the distal femur through the foot with more   focal 5.0 x 1.57 m hyperdense attenuation over the dorsum mid to hindfoot  which may reflect underlying hematoma. Clinical correlation would be  needed to exclude any signs of cellulitis or infection. No definite acute osseous abnormality is identified.  - WBC Count: 5.24 K/uL (07.02.22 @ 00:26)    #Abx allergy: NKDA    RECOMMENDATIONS  - clinical presentation and labs not suggestive of necrotizing infectious at this time -- likely cellulitis with hematoma   - Podiatry evaluation to see if this could potentially be evacuated  - continue cefazolin 1g q 8 hours   - monitor wound closely     Please call or message on Microsoft Teams if with any questions.  Spectra [phone]   ASSESSMENT  77 yo male with PMH of pre-diabetes, HTN who presents with fight foot pain.     IMPRESSION  #Right LE cellulitis with hematoma - SIRS not present on admission   - CT Angio Lower Extremity w/ IV Cont, Right (07.02.22 @ 01:50): Diffuse subcutaneous soft tissue edema and areas of skin thickening  extending from the level of the distal femur through the foot with more   focal 5.0 x 1.57 m hyperdense attenuation over the dorsum mid to hindfoot  which may reflect underlying hematoma. Clinical correlation would be  needed to exclude any signs of cellulitis or infection. No definite acute osseous abnormality is identified.  - WBC Count: 5.24 K/uL (07.02.22 @ 00:26)    #Abx allergy: NKDA    RECOMMENDATIONS  - clinical presentation and labs not suggestive of necrotizing infectious at this time -- likely cellulitis with hematoma   - agree with burn evaluation to see if this could potentially be evacuated  - continue cefazolin 1g q 8 hours   - monitor wound closely     Please call or message on [employer] Teams if with any questions.  Spectra [phone]

## 2022-07-02 NOTE — ED PROVIDER NOTE - PROGRESS NOTE DETAILS
ED Attending DEISY Millan  Pt and family aware of all results, pt with decreased ambulation, concern for cellulitis, abx ordered, medical admitting team aware of pt and admission.

## 2022-07-02 NOTE — H&P ADULT - HISTORY OF PRESENT ILLNESS
75 yo male   PMH :  pre-diabetes   Presenting for R foot pain that has been constant for the past 3 days.   States he fell and injured his R foot 3 days ago. Went to an ED in the city, had xrays done at the time, states they were negative. States that the swelling and discoloration to the foot have worsened since then. Unsure when he last received tetanus shot.    In the ED  Bp : 170/85  HR : 75   RR: 18  T : 97.9  O2 : 98% on RA    CT Angio LE R :   Intact arterial flow/triple-vessel runoff to the right lower extremity   without evidence for significant stenosis or acute injury.    Diffuse subcutaneous soft tissue edema and areas of skin thickening   extending from the level of the distal femur through the foot with more   focal 5.0 x 1.57 m hyperdense attenuation over the dorsum mid to hindfoot   which may reflect underlying hematoma. Clinical correlation would be   needed to exclude any signs of cellulitis or infection.    No definite acute osseous abnormality is identified.     75 yo male   PMH :  pre-diabetes   Presenting for R foot pain that has been constant for the past 3 days.   States he fell and injured his R foot 3 days ago. Went to an ED in the city, had xrays done at the time, states they were negative. States that the swelling and discoloration to the foot have worsened since then. Unsure when he last received tetanus shot.    In the ED  Bp : 170/85  HR : 75   RR: 18  T : 97.9  O2 : 98% on RA    Gluc : 341 , WBC 5    CT Angio LE R :   Intact arterial flow/triple-vessel runoff to the right lower extremity  without evidence for significant stenosis or acute injury.  Diffuse subcutaneous soft tissue edema and areas of skin thickening  extending from the level of the distal femur through the foot - possible cellulitis   Focal 5.0 x 1.57 m hyperdense attenuation over the dorsum mid to hindfoot - possible hematoma   No definite acute osseous abnormality is identified.    Started in Ancef 1g q8 in ED         77 yo male   PMH :  pre-diabetes , HT   Presenting for R foot pain that has been constant for the past 3 days.   States he fell on the sidewalk and injured his R knee and foot 3 days ago. It was a mechanical fall with no LOC, dizziness   Went to an ED at [facility] in [facility], had xrays done at the time, states they were negative and was given oral antibiotics  States that the swelling and discoloration to the foot have worsened since then.   Denies : fevers, previous infections, hx of known vascular disease    In the ED  Bp : 170/85  HR : 75   RR: 18  T : 97.9  O2 : 98% on RA    Gluc : 341 , WBC 5    CT Angio LE R :   Intact arterial flow/triple-vessel runoff to the right lower extremity  without evidence for significant stenosis or acute injury.  Diffuse subcutaneous soft tissue edema and areas of skin thickening  extending from the level of the distal femur through the foot - possible cellulitis   Focal 5.0 x 1.57 m hyperdense attenuation over the dorsum mid to hindfoot - possible hematoma   No definite acute osseous abnormality is identified.    Started on Ancef 1g q8 in ED    Patient pharmacy : [facility] [address] [facility] : closed on the weekend , reopens Monday 9am-6pm ([phone])  Please call to confirm med recc : Per patient he takes 1bp med and 1 water pill, can not remember the names

## 2022-07-02 NOTE — H&P ADULT - TIME BILLING
Total time spent to complete patient's bedside assessment, physical examination, review medical chart including labs & imaging, discuss medical plan of care with housestaff was more than 35 minutes

## 2022-07-02 NOTE — H&P ADULT - ASSESSMENT
# Cellulitis   -     # DM   -  75 yo male with PMH :  pre-diabetes , HT . Presenting for R foot pain for past 3d after mechanical fall.     # Cellulitis  - WBC : 5 on admission, afebrile   - non painful , purple discoloration , bullae +  - Cefazolin IV 1g q8  - f/u ID cosult reccs  - f/u Burn consult reccs     # DM - uncontrolled  - f/u finger sticks   - f/u A1c  - home on metformin 1000 qD non compliant   - insulin naive   - started on glargine, lispro + SS    # HT   - c/w home hctz 37.5/tramptorere 25 PO qD  - c/w home lisinopril 10 qD    # HLD  - c/w home atorvastatin 10 at bed time     DVT : heparin 5K q8  Diet : Carb consistent , DASH  Activity : Ambulate as barbra, uses cane at baseline, PT eval   Dispo : from home  75 yo male with PMH :  pre-diabetes , HT . Presenting for R foot pain for past 3d after mechanical fall.     # Cellulitis  - WBC : 5 on admission, afebrile   - non painful , purple discoloration , bullae +  - Cefazolin IV 1g q8  - f/u ID cosult reccs  - f/u Burn consult reccs     # DM - uncontrolled  - f/u finger sticks   - f/u A1c  - home on metformin 1000 qD non compliant   - insulin naive   - started on glargine 15 qhs , lispro 5 with meals + SS (low)    # HT   - c/w home hctz 37.5/tramptorere 25 PO qD  - c/w home lisinopril 10 qD    # HLD  - c/w home atorvastatin 10 at bed time     DVT : heparin 5K q8  Diet : Carb consistent , DASH  Activity : Ambulate as barbra, uses cane at baseline, PT eval   Dispo : from home  77 yo male with PMH :  pre-diabetes , HT . Presenting for R foot pain for past 3d after mechanical fall.     # Cellulitis  - WBC : 5 on admission, afebrile   - non painful , purple discoloration , bullae +     - CT Angio LE R :   Intact arterial flow/triple-vessel runoff to the right lower extremity  without evidence for significant stenosis or acute injury.  Diffuse subcutaneous soft tissue edema and areas of skin thickening  extending from the level of the distal femur through the foot - possible cellulitis   Focal 5.0 x 1.57 m hyperdense attenuation over the dorsum mid to hindfoot - possible hematoma   No definite acute osseous abnormality is identified.    - Cefazolin IV 1g q8   - f/u Bcx  - f/u ID cosult reccs for AB adjustment (per ID keep on Cefazolin until eval done)  - f/u Burn consult reccs     # DM - uncontrolled  - f/u finger sticks   - f/u A1c  - home on metformin 1000 qD   - insulin naive   - started on glargine 15 qhs , lispro 5 with meals + SS (low)    # HT   - c/w home hctz 37.5/tramptorere 25 PO qD  - c/w home lisinopril 10 qD    # HLD  - c/w home atorvastatin 10 at bed time     # BPH   - c/w home flomax and finasteride    DVT : heparin 5K q8  Diet : Carb consistent , DASH  Activity : Ambulate as barbra, uses cane at baseline, PT eval   Dispo : from home  77 yo male with PMH :  pre-diabetes , HT . Presenting for R foot pain for past 3d after mechanical fall.     # Cellulitis  - WBC : 5 on admission, afebrile   - non painful , purple discoloration , bullae +     - CT Angio LE R :   Intact arterial flow/triple-vessel runoff to the right lower extremity  without evidence for significant stenosis or acute injury.  Diffuse subcutaneous soft tissue edema and areas of skin thickening  extending from the level of the distal femur through the foot - possible cellulitis   Focal 5.0 x 1.57 m hyperdense attenuation over the dorsum mid to hindfoot - possible hematoma   No definite acute osseous abnormality is identified.    - Cefazolin IV 1g q8   - Pain ; not in pain at rest : Tylenol PRN for now  - f/u Bcx  - f/u ID cosult reccs for AB adjustment (per ID keep on Cefazolin until eval done)  - f/u Burn consult reccs     # DM - uncontrolled  - f/u finger sticks   - f/u A1c  - home on metformin 1000 qD   - insulin naive   - started on glargine 15 qhs , lispro 5 with meals + SS (low)    # HT   - c/w home hctz 37.5/tramptorere 25 PO qD  - c/w home lisinopril 10 qD    # HLD  - c/w home atorvastatin 10 at bed time     # BPH   - c/w home flomax and finasteride    DVT : heparin 5K q8  Diet : Carb consistent , DASH  Activity : Ambulate as barbra, uses cane at baseline, PT eval   Dispo : from home

## 2022-07-02 NOTE — PROGRESS NOTE ADULT - SUBJECTIVE AND OBJECTIVE BOX
T(C): 36.4 (07-02-22 @ 05:40), Max: 36.6 (07-01-22 @ 22:14)  HR: 63 (07-02-22 @ 05:40) (63 - 75)  BP: 132/73 (07-02-22 @ 05:40) (132/73 - 170/84)  RR: 18 (07-02-22 @ 05:40) (18 - 18)  SpO2: 98% (07-02-22 @ 03:19) (98% - 98%)        Physical Medicine And Rehabilitation Services are not indicated in this patient for the following Reason(s):    [    ] Patient is medically unstable      [  X  ]  Patient does not have appropriate activity orders      [     ] Patient has no weight bearing status for:      [     ] Patient is independently ambulating      [     ]  Patient is from Skilled Nursing Facility and is appropriate to return      [     ] Patient was non-ambulatory prior to admission      [     ]  Other      [facility] PM&R / PT request

## 2022-07-02 NOTE — ED PROVIDER NOTE - ATTENDING CONTRIBUTION TO CARE
76-year-old male with past medical history of prediabetes, denies being on anticoagulation, presents with right foot pain, throbbing, constant, moderate intensity, worse with movement, better at rest, associated with abrasions that are bleeding, swelling, and discoloration to the foot status post fall approximately 2 to 3 days ago.  Per stepdaughter at bedside patient is refusing to provide details of what happened but he ambulates with a cane and states that he was visiting his friends in [place] when he fell hurting his right foot.  Went to an emergency room in [place] and had x-rays done of the right lower extremity that were negative.  Since then the swelling and discoloration to the foot have worsened so his wife made him come to the emergency department.  Unsure of last tetanus.  Patient denies head trauma or LOC.  No fever, chills, n/v, cp,  pleuritic cp, sob, palpitations, diaphoresis, cough, chest wall/rib pain, clavicular pain, knee pain, shoulder pain, wrist pain, no back pain, no hip pain, no ha/lh/dizziness, numbness/tingling, neck pain/ stiffness, abd pain,  melena/brbpr, urinary symptoms, calf pain/swelling/erythema, sick contacts, recent travel . GCS 15.    On Exam:  Vital Signs: I have reviewed the initial vital signs.  Constitutional: Non toxic appearing pt sitting on stretcher in nad.  HEAD: No signs of basilar skull fracture.  Integumentary: No rash. No laceration. R dorsal foot swelling, ecchymoses, superficial lateral abrasions and dorsal abrasion, bleeding, no tendon rupture in a bloodless field. Bluish discoloration to R foot. DP and PT pulses dopperable. R lateral thigh and R lateral knee with ecchymoses.  EYES: No periorbital swelling/ecchymoses. PERRRL, EOM intact. No nystagmus. No subconjunctival hemorrhage.   ENT: MMM. No rhinorrhea/otorrhea. No epistaxis,  No septal hematoma. No mastoid ecchymoses. No intraoral bleeding, No loose or cracked teeth, no active bleeding. No malocclusion. No TMJ pain.  NECK: Supple, non-tender, No palpable shelves or step-offs.  BACK: No spinous tenderness. No palpable shelves or step-offs.  Cardiovascular: RRR, radial pulses 2/4 b/l. dp and pt pulses 2/4/ b/l. No pain to palpation to chest wall.  Respiratory: BS present b/l, ctabl, no wheezing or crackles, no stridor. No pain to palpation to ribs b/l. No crepitus. No subq emphysema.   Gastrointestinal: BS present throughout all 4 quadrants, soft, nd, nt. no r/g.  Musculoskeletal: R dorsal foot pain with decreased ROM of R ankle in all motions due to pain, no pain to palpation to r knee, fibular head, patella, femur, base of metatarsal or tib fib with FROM of knee, hip and toes.No pain to palpation to clavicles.. No hip pain. No short leg. No internal or external rotation of LE  Neurologic: GCS 15. CN II-XII intact, no facial droop or slurring of speech. Motor 5/5 and sensation intact throughout upper and lower extremities. NIHSS 0.

## 2022-07-02 NOTE — ED PROVIDER NOTE - CARE PLAN
Assessment and plan of treatment:	Plan: pain control, labs, tdap, imaging of RLE, reassess.   1 Principal Discharge DX:	Cellulitis  Assessment and plan of treatment:	Plan: pain control, labs, tdap, imaging of RLE, reassess.

## 2022-07-02 NOTE — PATIENT PROFILE ADULT - FALL HARM RISK - HARM RISK INTERVENTIONS

## 2022-07-02 NOTE — ED PROVIDER NOTE - OBJECTIVE STATEMENT
75 yo male with pmh of pre-diabetes presenting for R foot pain that has been constant for the past 3 days. States he fell and injured his R foot 3 days ago. Went to an ED in the city, had xrays done at the time, states they were negative. States that the swelling and discoloration to the foot have worsened since then. Unsure when he last received tetanus shot.

## 2022-07-02 NOTE — H&P ADULT - NSHPPHYSICALEXAM_GEN_ALL_CORE
GENERAL: NAD, lying in bed comfortably  HEAD:  Atraumatic, Normocephalic  EYES: EOMI, PERRLA, conjunctiva and sclera clear  ENT: Moist mucous membranes  NECK: Supple, No JVD  CHEST/LUNG:  No rales, rhonchi, wheezing, or rubs. Unlabored respirations  HEART: Regular rate and rhythm  ABDOMEN: ; Soft, Nontender, Nondistended.  EXTREMITIES:  R foot dorsal bulli and purplre discoloration of entire foot speading up to calf , + bruising on lateral knee  NERVOUS SYSTEM:  Alert & Oriented X3,  MSK: can walk , uses cane at baseline GENERAL: NAD, lying in bed comfortably  HEAD:  Atraumatic, Normocephalic  EYES: EOMI, PERRLA, conjunctiva and sclera clear  ENT: Moist mucous membranes  NECK: Supple, No JVD  CHEST/LUNG:  No rales, rhonchi, wheezing, or rubs. Unlabored respirations  HEART: Regular rate and rhythm  ABDOMEN: ; Soft, Nontender, Nondistended.  EXTREMITIES:  R foot dorsal bullae and purplre discoloration of entire foot speading up to calf , + bruising on lateral knee, terner lateral foot  NERVOUS SYSTEM:  Alert & Oriented X3,  MSK: can walk , uses cane at baseline

## 2022-07-02 NOTE — GOALS OF CARE CONVERSATION - ADVANCED CARE PLANNING - CONVERSATION DETAILS
Spoke with patient at bedside and patient's wife, Racquel, over the phone. Discussed diagnosis of cellulitis, and what we will be treating with and the specialists that will come him to evaluate him. Asked if patient had a prior MOLST or DNR/DNI, which he does not. Per wife and patient, patient is FULL CODE.

## 2022-07-03 LAB
ALBUMIN SERPL ELPH-MCNC: 4.4 G/DL — SIGNIFICANT CHANGE UP (ref 3.5–5.2)
ALP SERPL-CCNC: 115 U/L — SIGNIFICANT CHANGE UP (ref 30–115)
ALT FLD-CCNC: 27 U/L — SIGNIFICANT CHANGE UP (ref 0–41)
ANION GAP SERPL CALC-SCNC: 11 MMOL/L — SIGNIFICANT CHANGE UP (ref 7–14)
AST SERPL-CCNC: 27 U/L — SIGNIFICANT CHANGE UP (ref 0–41)
BASOPHILS # BLD AUTO: 0.05 K/UL — SIGNIFICANT CHANGE UP (ref 0–0.2)
BASOPHILS NFR BLD AUTO: 0.8 % — SIGNIFICANT CHANGE UP (ref 0–1)
BILIRUB SERPL-MCNC: 0.7 MG/DL — SIGNIFICANT CHANGE UP (ref 0.2–1.2)
BUN SERPL-MCNC: 14 MG/DL — SIGNIFICANT CHANGE UP (ref 10–20)
CALCIUM SERPL-MCNC: 9.1 MG/DL — SIGNIFICANT CHANGE UP (ref 8.5–10.1)
CHLORIDE SERPL-SCNC: 97 MMOL/L — LOW (ref 98–110)
CO2 SERPL-SCNC: 28 MMOL/L — SIGNIFICANT CHANGE UP (ref 17–32)
CREAT SERPL-MCNC: 0.7 MG/DL — SIGNIFICANT CHANGE UP (ref 0.7–1.5)
EGFR: 95 ML/MIN/1.73M2 — SIGNIFICANT CHANGE UP
EOSINOPHIL # BLD AUTO: 0.2 K/UL — SIGNIFICANT CHANGE UP (ref 0–0.7)
EOSINOPHIL NFR BLD AUTO: 3.3 % — SIGNIFICANT CHANGE UP (ref 0–8)
GLUCOSE BLDC GLUCOMTR-MCNC: 149 MG/DL — HIGH (ref 70–99)
GLUCOSE BLDC GLUCOMTR-MCNC: 182 MG/DL — HIGH (ref 70–99)
GLUCOSE BLDC GLUCOMTR-MCNC: 194 MG/DL — HIGH (ref 70–99)
GLUCOSE BLDC GLUCOMTR-MCNC: 237 MG/DL — HIGH (ref 70–99)
GLUCOSE SERPL-MCNC: 160 MG/DL — HIGH (ref 70–99)
HCT VFR BLD CALC: 38.7 % — LOW (ref 42–52)
HGB BLD-MCNC: 13.8 G/DL — LOW (ref 14–18)
IMM GRANULOCYTES NFR BLD AUTO: 1 % — HIGH (ref 0.1–0.3)
LYMPHOCYTES # BLD AUTO: 1.06 K/UL — LOW (ref 1.2–3.4)
LYMPHOCYTES # BLD AUTO: 17.7 % — LOW (ref 20.5–51.1)
MAGNESIUM SERPL-MCNC: 2 MG/DL — SIGNIFICANT CHANGE UP (ref 1.8–2.4)
MCHC RBC-ENTMCNC: 34.9 PG — HIGH (ref 27–31)
MCHC RBC-ENTMCNC: 35.7 G/DL — SIGNIFICANT CHANGE UP (ref 32–37)
MCV RBC AUTO: 98 FL — HIGH (ref 80–94)
MONOCYTES # BLD AUTO: 0.81 K/UL — HIGH (ref 0.1–0.6)
MONOCYTES NFR BLD AUTO: 13.5 % — HIGH (ref 1.7–9.3)
NEUTROPHILS # BLD AUTO: 3.8 K/UL — SIGNIFICANT CHANGE UP (ref 1.4–6.5)
NEUTROPHILS NFR BLD AUTO: 63.7 % — SIGNIFICANT CHANGE UP (ref 42.2–75.2)
NRBC # BLD: 0 /100 WBCS — SIGNIFICANT CHANGE UP (ref 0–0)
PLATELET # BLD AUTO: 202 K/UL — SIGNIFICANT CHANGE UP (ref 130–400)
POTASSIUM SERPL-MCNC: 4.2 MMOL/L — SIGNIFICANT CHANGE UP (ref 3.5–5)
POTASSIUM SERPL-SCNC: 4.2 MMOL/L — SIGNIFICANT CHANGE UP (ref 3.5–5)
PROT SERPL-MCNC: 7 G/DL — SIGNIFICANT CHANGE UP (ref 6–8)
RBC # BLD: 3.95 M/UL — LOW (ref 4.7–6.1)
RBC # FLD: 12.5 % — SIGNIFICANT CHANGE UP (ref 11.5–14.5)
SODIUM SERPL-SCNC: 136 MMOL/L — SIGNIFICANT CHANGE UP (ref 135–146)
WBC # BLD: 5.98 K/UL — SIGNIFICANT CHANGE UP (ref 4.8–10.8)
WBC # FLD AUTO: 5.98 K/UL — SIGNIFICANT CHANGE UP (ref 4.8–10.8)

## 2022-07-03 PROCEDURE — 99232 SBSQ HOSP IP/OBS MODERATE 35: CPT

## 2022-07-03 RX ADMIN — Medication 5 UNIT(S): at 11:26

## 2022-07-03 RX ADMIN — Medication 650 MILLIGRAM(S): at 03:25

## 2022-07-03 RX ADMIN — Medication 100 MILLIGRAM(S): at 13:39

## 2022-07-03 RX ADMIN — Medication 5 UNIT(S): at 16:56

## 2022-07-03 RX ADMIN — Medication 325 MILLIGRAM(S): at 11:30

## 2022-07-03 RX ADMIN — INSULIN GLARGINE 15 UNIT(S): 100 INJECTION, SOLUTION SUBCUTANEOUS at 22:30

## 2022-07-03 RX ADMIN — Medication 1: at 16:54

## 2022-07-03 RX ADMIN — Medication 1 TABLET(S): at 05:47

## 2022-07-03 RX ADMIN — Medication 650 MILLIGRAM(S): at 11:49

## 2022-07-03 RX ADMIN — FINASTERIDE 5 MILLIGRAM(S): 5 TABLET, FILM COATED ORAL at 11:28

## 2022-07-03 RX ADMIN — HEPARIN SODIUM 5000 UNIT(S): 5000 INJECTION INTRAVENOUS; SUBCUTANEOUS at 13:40

## 2022-07-03 RX ADMIN — Medication 5 UNIT(S): at 07:54

## 2022-07-03 RX ADMIN — Medication 650 MILLIGRAM(S): at 04:01

## 2022-07-03 RX ADMIN — LISINOPRIL 10 MILLIGRAM(S): 2.5 TABLET ORAL at 05:44

## 2022-07-03 RX ADMIN — HEPARIN SODIUM 5000 UNIT(S): 5000 INJECTION INTRAVENOUS; SUBCUTANEOUS at 21:25

## 2022-07-03 RX ADMIN — HEPARIN SODIUM 5000 UNIT(S): 5000 INJECTION INTRAVENOUS; SUBCUTANEOUS at 05:46

## 2022-07-03 RX ADMIN — Medication 650 MILLIGRAM(S): at 15:51

## 2022-07-03 RX ADMIN — Medication 100 MILLIGRAM(S): at 05:44

## 2022-07-03 RX ADMIN — ATORVASTATIN CALCIUM 10 MILLIGRAM(S): 80 TABLET, FILM COATED ORAL at 21:25

## 2022-07-03 RX ADMIN — Medication 2: at 11:25

## 2022-07-03 RX ADMIN — TAMSULOSIN HYDROCHLORIDE 0.4 MILLIGRAM(S): 0.4 CAPSULE ORAL at 21:25

## 2022-07-03 RX ADMIN — Medication 100 MILLIGRAM(S): at 21:25

## 2022-07-03 NOTE — CONSULT NOTE ADULT - TIME BILLING
wound eval and treat
I have personally seen and examined this patient.    I have reviewed all pertinent clinical information and reviewed all relevant imaging and diagnostic studies personally.   I counseled the patient about diagnostic testing and treatment plan. All questions were answered.   I discussed recommendations with the primary team.

## 2022-07-03 NOTE — PROGRESS NOTE ADULT - SUBJECTIVE AND OBJECTIVE BOX
SHAILA LAL 76y Male  MRN#: [id number]     SUBJECTIVE  Patient was examined and seen at bedside. This morning they are resting comfortably in bed and report no issues or overnight events.                                             OBJECTIVE     ALLERGIES:  No Known Allergies      PHYSICAL EXAM:  GENERAL: NAD, lying in bed comfortably  HEAD:  Atraumatic, Normocephalic  EYES: EOMI, PERRLA, conjunctiva and sclera clear  ENT: Moist mucous membranes  NECK: Supple, No JVD  CHEST/LUNG: Clear to auscultation bilaterally; No rales, rhonchi, wheezing, or rubs. Unlabored respirations  HEART: Regular rate and rhythm; No murmurs, rubs, or gallops  ABDOMEN: BSx4; Soft, nontender, nondistended  EXTREMITIES:  2+ Peripheral Pulses, brisk capillary refill. No clubbing, cyanosis, or edema  NERVOUS SYSTEM:  A&Ox3, no focal deficits   SKIN: No rashes or lesions                                             MEDICATIONS:  STANDING MEDICATIONS a    atorvastatin 10 milliGRAM(s) Oral at bedtime  ceFAZolin   IVPB 1000 milliGRAM(s) IV Intermittent every 8 hours  dextrose 5%. 1000 milliLiter(s) IV Continuous <Continuous>  dextrose 5%. 1000 milliLiter(s) IV Continuous <Continuous>  dextrose 50% Injectable 25 Gram(s) IV Push once  dextrose 50% Injectable 12.5 Gram(s) IV Push once  dextrose 50% Injectable 25 Gram(s) IV Push once  finasteride 5 milliGRAM(s) Oral daily  glucagon  Injectable 1 milliGRAM(s) IntraMuscular once  heparin   Injectable 5000 Unit(s) SubCutaneous every 8 hours  insulin glargine Injectable (LANTUS) 15 Unit(s) SubCutaneous at bedtime  insulin lispro (ADMELOG) corrective regimen sliding scale   SubCutaneous three times a day before meals  insulin lispro Injectable (ADMELOG) 5 Unit(s) SubCutaneous three times a day before meals  lisinopril 10 milliGRAM(s) Oral daily  tamsulosin 0.4 milliGRAM(s) Oral at bedtime  triamterene 37.5 mG/hydrochlorothiazide 25 mG Tablet 1 Tablet(s) Oral daily    PRN MEDICATIONS  acetaminophen     Tablet .. 650 milliGRAM(s) Oral every 6 hours PRN  acetaminophen     Tablet .. 650 milliGRAM(s) Oral every 6 hours PRN  aluminum hydroxide/magnesium hydroxide/simethicone Suspension 30 milliLiter(s) Oral every 4 hours PRN  dextrose Oral Gel 15 Gram(s) Oral once PRN  melatonin 3 milliGRAM(s) Oral at bedtime PRN  ondansetron Injectable 4 milliGRAM(s) IV Push every 8 hours PRN                                            ------------------------------------------------------------  VITAL SIGNS: Last 24 Hours  T(C): 36.7 (02 Jul 2022 21:46), Max: 37.4 (02 Jul 2022 12:10)  T(F): 98 (02 Jul 2022 21:46), Max: 99.3 (02 Jul 2022 12:10)  HR: 62 (02 Jul 2022 21:46) (62 - 68)  BP: 130/76 (02 Jul 2022 21:46) (130/76 - 153/73)  BP(mean): --  RR: 18 (02 Jul 2022 21:46) (18 - 18)  SpO2: --                                               LABS:                        13.7   7.26  )-----------( 192      ( 02 Jul 2022 12:04 )             38.4     07-02    138  |  99  |  14  ----------------------------<  201<H>  3.9   |  29  |  0.7    Ca    8.8      02 Jul 2022 12:04  Mg     1.9     07-02    TPro  7.1  /  Alb  4.5  /  TBili  0.7  /  DBili  x   /  AST  38  /  ALT  37  /  AlkPhos  115  07-02    PT/INR - ( 02 Jul 2022 00:26 )   PT: 12.90 sec;   INR: 1.12 ratio         PTT - ( 02 Jul 2022 00:26 )  PTT:32.5 sec      Sedimentation Rate, Erythrocyte: 20 mm/Hr *H* (07-02-22 @ 12:04)                                                      RADIOLOGY:                                                 ASSESSMENT & PLAN    	  77 yo male with PMH :  pre-diabetes , HT . Presenting for R foot pain for past 3d after mechanical fall.     # Cellulitis  - WBC : 5 on admission, afebrile   - non painful , purple discoloration , bullae +     - CT Angio LE R :   Intact arterial flow/triple-vessel runoff to the right lower extremity  without evidence for significant stenosis or acute injury.  Diffuse subcutaneous soft tissue edema and areas of skin thickening  extending from the level of the distal femur through the foot - possible cellulitis   Focal 5.0 x 1.57 m hyperdense attenuation over the dorsum mid to hindfoot - possible hematoma   No definite acute osseous abnormality is identified.    - Cefazolin IV 1g q8   - Pain ; not in pain at rest : Tylenol PRN for now  - f/u Bcx  - ID cosult reccs for AB adjustment (per ID keep on Cefazolin until eval done)  - f/u Burn consult reccs     # DM - uncontrolled  - f/u finger sticks   - f/u A1c  - home on metformin 1000 qD   - insulin naive   - started on glargine 15 qhs , lispro 5 with meals + SS (low)    # HT   - c/w home hctz 37.5/tramptorere 25 PO qD  - c/w home lisinopril 10 qD    # HLD  - c/w home atorvastatin 10 at bed time     # BPH   - c/w home flomax and finasteride    DVT : heparin 5K q8  Diet : Carb consistent , DASH  Activity : Ambulate as barbra, uses cane at baseline, PT eval   Dispo : from home                                                                                                           ----------------------------------------------------  # DVT prophylaxis     # GI prophylaxis     # Diet       # Code status     # Disposition                                                                              --------------------------------------------------------         SHAILA LAL 76y Male  MRN#: [id number]     SUBJECTIVE  Patient was examined and seen at bedside. This morning they are resting comfortably in bed and report no issues or overnight events.                                             OBJECTIVE     ALLERGIES:  No Known Allergies      PHYSICAL EXAM:  CONSTITUTIONAL:  No weakness, fevers or chills  EYES/ENT:  No visual changes;  No vertigo or throat pain   NECK:  No pain or stiffness  RESPIRATORY:  No cough, wheezing, hemoptysis; No shortness of breath  CARDIOVASCULAR:  No chest pain or palpitations  GASTROINTESTINAL:  No abdominal or epigastric pain. No nausea, vomiting, or hematemesis; No diarrhea or constipation. No melena or hematochezia.  GENITOURINARY:  No dysuria, frequency or hematuria  NEUROLOGICAL:  No numbness or weakness  SKIN:  No itching, rashes                                               MEDICATIONS:  STANDING MEDICATIONS a    atorvastatin 10 milliGRAM(s) Oral at bedtime  ceFAZolin   IVPB 1000 milliGRAM(s) IV Intermittent every 8 hours  dextrose 5%. 1000 milliLiter(s) IV Continuous <Continuous>  dextrose 5%. 1000 milliLiter(s) IV Continuous <Continuous>  dextrose 50% Injectable 25 Gram(s) IV Push once  dextrose 50% Injectable 12.5 Gram(s) IV Push once  dextrose 50% Injectable 25 Gram(s) IV Push once  finasteride 5 milliGRAM(s) Oral daily  glucagon  Injectable 1 milliGRAM(s) IntraMuscular once  heparin   Injectable 5000 Unit(s) SubCutaneous every 8 hours  insulin glargine Injectable (LANTUS) 15 Unit(s) SubCutaneous at bedtime  insulin lispro (ADMELOG) corrective regimen sliding scale   SubCutaneous three times a day before meals  insulin lispro Injectable (ADMELOG) 5 Unit(s) SubCutaneous three times a day before meals  lisinopril 10 milliGRAM(s) Oral daily  tamsulosin 0.4 milliGRAM(s) Oral at bedtime  triamterene 37.5 mG/hydrochlorothiazide 25 mG Tablet 1 Tablet(s) Oral daily    PRN MEDICATIONS  acetaminophen     Tablet .. 650 milliGRAM(s) Oral every 6 hours PRN  acetaminophen     Tablet .. 650 milliGRAM(s) Oral every 6 hours PRN  aluminum hydroxide/magnesium hydroxide/simethicone Suspension 30 milliLiter(s) Oral every 4 hours PRN  dextrose Oral Gel 15 Gram(s) Oral once PRN  melatonin 3 milliGRAM(s) Oral at bedtime PRN  ondansetron Injectable 4 milliGRAM(s) IV Push every 8 hours PRN                                            ------------------------------------------------------------  VITAL SIGNS: Last 24 Hours  T(C): 36.7 (02 Jul 2022 21:46), Max: 37.4 (02 Jul 2022 12:10)  T(F): 98 (02 Jul 2022 21:46), Max: 99.3 (02 Jul 2022 12:10)  HR: 62 (02 Jul 2022 21:46) (62 - 68)  BP: 130/76 (02 Jul 2022 21:46) (130/76 - 153/73)  BP(mean): --  RR: 18 (02 Jul 2022 21:46) (18 - 18)  SpO2: --                                               LABS:                        13.7   7.26  )-----------( 192      ( 02 Jul 2022 12:04 )             38.4     07-02    138  |  99  |  14  ----------------------------<  201<H>  3.9   |  29  |  0.7    Ca    8.8      02 Jul 2022 12:04  Mg     1.9     07-02    TPro  7.1  /  Alb  4.5  /  TBili  0.7  /  DBili  x   /  AST  38  /  ALT  37  /  AlkPhos  115  07-02    PT/INR - ( 02 Jul 2022 00:26 )   PT: 12.90 sec;   INR: 1.12 ratio         PTT - ( 02 Jul 2022 00:26 )  PTT:32.5 sec      Sedimentation Rate, Erythrocyte: 20 mm/Hr *H* (07-02-22 @ 12:04)                                                      RADIOLOGY:                                                 ASSESSMENT & PLAN    	  75 yo male with PMH :  pre-diabetes , HT . Presenting for R foot pain for past 3d after mechanical fall.     # Cellulitis  - WBC : 5 on admission, afebrile   - non painful , purple discoloration , bullae +     - CT Angio LE R :   Intact arterial flow/triple-vessel runoff to the right lower extremity  without evidence for significant stenosis or acute injury.  Diffuse subcutaneous soft tissue edema and areas of skin thickening  extending from the level of the distal femur through the foot - possible cellulitis   Focal 5.0 x 1.57 m hyperdense attenuation over the dorsum mid to hindfoot - possible hematoma   No definite acute osseous abnormality is identified.    - Cefazolin IV 1g q8   - Pain ; not in pain at rest : Tylenol PRN for now  - f/u Bcx  - ID cosult reccs for AB adjustment (per ID keep on Cefazolin until eval done)  - f/u Burn consult reccs     # DM - uncontrolled  - f/u finger sticks   - f/u A1c  - home on metformin 1000 qD   - insulin naive   - started on glargine 15 qhs , lispro 5 with meals + SS (low)    # HT   - c/w home hctz 37.5/tramptorere 25 PO qD  - c/w home lisinopril 10 qD    # HLD  - c/w home atorvastatin 10 at bed time     # BPH   - c/w home flomax and finasteride    DVT : heparin 5K q8  Diet : Carb consistent , DASH  Activity : Ambulate as barbra, uses cane at baseline, PT eval   Dispo : from home                                                                                                           ----------------------------------------------------  # DVT prophylaxis     # GI prophylaxis     # Diet       # Code status     # Disposition                                                                              --------------------------------------------------------

## 2022-07-03 NOTE — CONSULT NOTE ADULT - ASSESSMENT
IMPRESSION: Rehab of gait dysfunction / RLE cellulitis     PRECAUTIONS: [   ] Cardiac  [   ] Respiratory  [   ] Seizures [   ] Contact Isolation  [   ] Droplet Isolation  [   ] Other    Weight Bearing Status:     RECOMMENDATION:    Out of Bed to Chair     DVT/Decubiti Prophylaxis    REHAB PLAN:     [ x   ] Bedside P/T 3-5 times a week   [    ]   Bedside O/T  2-3 times a week             [    ] Speech Therapy               [    ]  No Rehab Therapy Indicated   Conditioning/ROM                                    ADL  Bed Mobility                                               Conditioning/ROM  Transfers                                                     Bed Mobility  Sitting /Standing Balance                         Transfers                                        Gait Training                                               Sitting/Standing Balance  Stair Training [   ]Applicable                    Home equipment Eval                                                                        Splinting  [   ] Only      GOALS:   ADL   [    ]   Independent                    Transfers  [ x   ] Independent                          Ambulation  [ x   ] Independent     [   x  ] With device                            [    ]  CG                                                         [    ]  CG                                                                  [    ] CG                            [    ] Min A                                                   [    ] Min A                                                              [    ] Min  A          DISCHARGE PLAN:   [    ]  Good candidate for Intensive Rehabilitation/Hospital based                                             Will tolerate 3hrs Intensive Rehab Daily                                       [   x  ]  Short Term Rehab in Skilled Nursing Facility                           vs            [  x   ]  Home with Outpatient or VN services                                         [     ]  Possible Candidate for Intensive Hospital based Rehab

## 2022-07-03 NOTE — CONSULT NOTE ADULT - SUBJECTIVE AND OBJECTIVE BOX
76y  Male  HPI:  75 yo male   PMH :  pre-diabetes , HT   Presenting for R foot pain that has been constant for the past 3 days.   States he fell on the sidewalk and injured his R knee and foot 3 days ago. It was a mechanical fall with no LOC, dizziness   Went to an ED at [facility] in [facility], had xrays done at the time, states they were negative and was given oral antibiotics  States that the swelling and discoloration to the foot have worsened since then.   Denies : fevers, previous infections, hx of known vascular disease    In the ED  Bp : 170/85  HR : 75   RR: 18  T : 97.9  O2 : 98% on RA    Gluc : 341 , WBC 5    CT Angio LE R :   Intact arterial flow/triple-vessel runoff to the right lower extremity  without evidence for significant stenosis or acute injury.  Diffuse subcutaneous soft tissue edema and areas of skin thickening  extending from the level of the distal femur through the foot - possible cellulitis   Focal 5.0 x 1.57 m hyperdense attenuation over the dorsum mid to hindfoot - possible hematoma   No definite acute osseous abnormality is identified.    Started on Ancef 1g q8 in ED    Patient pharmacy : [facility] and [address] [place] [address] : closed on the weekend , reopens Monday 9am-6pm ([phone])  Please call to confirm med recc : Per patient he takes 1bp med and 1 water pill, can not remember the names          (02 Jul 2022 10:13)    Hospital course***  Allergies    No Known Allergies    Intolerances      PAST MEDICAL & SURGICAL HISTORY:  Prediabetes    CT Angio:  IMPRESSION:    Intact arterial flow/triple-vessel runoff to the right lower extremity   without evidence for significant stenosis or acute injury.    Diffuse subcutaneous soft tissue edema and areas of skin thickening   extending from the level of the distal femur through the foot with more   focal 5.0 x 1.57 m hyperdense attenuation over the dorsum mid to hindfoot   which may reflect underlying hematoma. Clinical correlation would be   needed to exclude any signs of cellulitis or infection.    No definite acute osseous abnormality is identified.      Labs:                        13.8   5.98  )-----------( 202      ( 03 Jul 2022 07:49 )             38.7       PE:  PHYSICAL EXAM: AAo x 3  Rt Foot swelling and echymosis  areas of blisters to the dorsum and lateral area  PT/DP palpable  no tingling numbness of foot good cap refill

## 2022-07-03 NOTE — PROGRESS NOTE ADULT - ASSESSMENT
76 Y M with pmh of pre-DM, HTN, BPH presents to ED after hurting his right foot on Monday. He says he was walking and tripped, rolled his right ankle/foot, and since then has had significant pain and swelling that is worsening. He went to [facility] in [place], had xrays done (which he says were negative), and given oral antibiotics. However, has had worsening pain and swelling, now with worsening bruising and inability to ambulate. Denies fevers, previous infections, loss of sensation.     #RLE cellulitis, now with bullae and significant soft tissue swelling   -SIRS not present on admission  -continue ancef 1g iv q8h per ID- eval appreciated  -tylenol prn for pain  -distal pulses present  -burn consult appreciated- Wound care - Xeroform/Kerlex/ACE Wrap BID, Elevation  -va duplex RLE venous - pending   -send a1c- pending results    # DM - uncontrolled  - f/u finger sticks- controlled  - f/u A1c  - home on metformin 1000 qD   - insulin naive    - c/w glargine 15 qhs , lispro 5 with meals + SS (low)    # HTN- controlled  - c/w home hctz 37.5/tramptorere 25 PO qD  - c/w home lisinopril 10 qD    # HLD  - c/w home atorvastatin 10 at bed time     # BPH   - c/w home flomax and finasteride    #DVT PPx- LVX 40mg sq qhs  #GI PPx- None   #Diet- DASH/TLC/CC  #CHG  #Activity- AAT; PT/Rehab   #Dispo- Acute; pending improvement of swelling, PT/rehab; possibly home in next 24-48 hrs  #Code- FULL.

## 2022-07-03 NOTE — CONSULT NOTE ADULT - SUBJECTIVE AND OBJECTIVE BOX
HPI:  75 yo male   PMH :  pre-diabetes , HT   Presenting for R foot pain that has been constant for the past 3 days.   States he fell on the sidewalk and injured his R knee and foot 3 days ago. It was a mechanical fall with no LOC, dizziness   Went to an ED at [facility] in [facility], had xrays done at the time, states they were negative and was given oral antibiotics  States that the swelling and discoloration to the foot have worsened since then.   Denies : fevers, previous infections, hx of known vascular disease    In the ED  Bp : 170/85  HR : 75   RR: 18  T : 97.9  O2 : 98% on RA    Gluc : 341 , WBC 5    CT Angio LE R :   Intact arterial flow/triple-vessel runoff to the right lower extremity  without evidence for significant stenosis or acute injury.  Diffuse subcutaneous soft tissue edema and areas of skin thickening  extending from the level of the distal femur through the foot - possible cellulitis   Focal 5.0 x 1.57 m hyperdense attenuation over the dorsum mid to hindfoot - possible hematoma   No definite acute osseous abnormality is identified.    Started on Ancef 1g q8 in ED    [facility] pharmacy : [facility] and [address] [place] [address] : closed on the weekend , reopens Monday 9am-6pm ([phone])  Please call to confirm med recc : Per patient he takes 1bp med and 1 water pill, can not remember the names               PAST MEDICAL & SURGICAL HISTORY:  Prediabetes          Hospital Course:    TODAY'S SUBJECTIVE & REVIEW OF SYMPTOMS:     Constitutional WNL   Cardio WNL   Resp WNL   GI WNL  Heme WNL  Endo WNL  Skin WNL  MSK right leg pain  Neuro WNL  Cognitive WNL  Psych WNL      MEDICATIONS  (STANDING):  atorvastatin 10 milliGRAM(s) Oral at bedtime  ceFAZolin   IVPB 1000 milliGRAM(s) IV Intermittent every 8 hours  dextrose 5%. 1000 milliLiter(s) (50 mL/Hr) IV Continuous <Continuous>  dextrose 5%. 1000 milliLiter(s) (100 mL/Hr) IV Continuous <Continuous>  dextrose 50% Injectable 25 Gram(s) IV Push once  dextrose 50% Injectable 12.5 Gram(s) IV Push once  dextrose 50% Injectable 25 Gram(s) IV Push once  finasteride 5 milliGRAM(s) Oral daily  glucagon  Injectable 1 milliGRAM(s) IntraMuscular once  heparin   Injectable 5000 Unit(s) SubCutaneous every 8 hours  insulin glargine Injectable (LANTUS) 15 Unit(s) SubCutaneous at bedtime  insulin lispro (ADMELOG) corrective regimen sliding scale   SubCutaneous three times a day before meals  insulin lispro Injectable (ADMELOG) 5 Unit(s) SubCutaneous three times a day before meals  lisinopril 10 milliGRAM(s) Oral daily  tamsulosin 0.4 milliGRAM(s) Oral at bedtime  triamterene 37.5 mG/hydrochlorothiazide 25 mG Tablet 1 Tablet(s) Oral daily    MEDICATIONS  (PRN):  acetaminophen     Tablet .. 650 milliGRAM(s) Oral every 6 hours PRN Temp greater or equal to 38C (100.4F), Mild Pain (1 - 3)  acetaminophen     Tablet .. 650 milliGRAM(s) Oral every 6 hours PRN Temp greater or equal to 38C (100.4F), Mild Pain (1 - 3), Moderate Pain (4 - 6)  aluminum hydroxide/magnesium hydroxide/simethicone Suspension 30 milliLiter(s) Oral every 4 hours PRN Dyspepsia  dextrose Oral Gel 15 Gram(s) Oral once PRN Blood Glucose LESS THAN 70 milliGRAM(s)/deciliter  melatonin 3 milliGRAM(s) Oral at bedtime PRN Insomnia  ondansetron Injectable 4 milliGRAM(s) IV Push every 8 hours PRN Nausea and/or Vomiting      FAMILY HISTORY:      Allergies    No Known Allergies    Intolerances        SOCIAL HISTORY:    [  ] Etoh  [  ] Smoking  [  ] Substance abuse     Home Environment:  [   ] Home Alone  [ x  ] Lives with Family  [   ] Home Health Aid    Dwelling:  [  x ] Apartment  [   ] Private House  [   ] Adult Home  [   ] Skilled Nursing Facility      [   ] Short Term  [   ] Long Term  [ x  ] Stairs       Elevator [   ]    FUNCTIONAL STATUS PTA: (Check all that apply)  Ambulation: [  x  ]Independent    [   ] Dependent     [   ] Non-Ambulatory  Assistive Device: [ x  ] SA Cane  [   ]  Q Cane  [   ] Walker  [   ]  Wheelchair  ADL : [  x ] Independent  [    ]  Dependent       Vital Signs Last 24 Hrs  T(C): 37.1 (03 Jul 2022 13:10), Max: 37.1 (03 Jul 2022 13:10)  T(F): 98.7 (03 Jul 2022 13:10), Max: 98.7 (03 Jul 2022 13:10)  HR: 76 (03 Jul 2022 13:10) (62 - 77)  BP: 115/63 (03 Jul 2022 13:10) (115/63 - 130/77)  BP(mean): --  RR: 18 (03 Jul 2022 13:10) (18 - 18)  SpO2: --      PHYSICAL EXAM: Awake & Alert  GENERAL: NAD  HEAD:  Normocephalic  CHEST/LUNG: Clear   HEART: S1S2+  ABDOMEN: Soft, Nontender  EXTREMITIES: no left calf tenderness     NERVOUS SYSTEM:  Cranial Nerves 2-12 intact [   ] Abnormal  [   ]  ROM: WFL all extremities [  x ]  Abnormal [   ]  Motor Strength: WFL all extremities  [   ]  Abnormal [ x  ]limited right distal LE  Sensation: intact to light touch [ x  ] Abnormal [   ]    FUNCTIONAL STATUS:  Bed Mobility: Independent [   ]  Supervision [   ]  Needs Assistance [ x  ]  N/A [   ]  Transfers: Independent [   ]  Supervision [   ]  Needs Assistance [ x  ]  N/A [   ]   Ambulation: Independent [   ]  Supervision [   ]  Needs Assistance [x   ]  N/A [   ]  ADL: Independent [   ] Requires Assistance [   ] N/A [   ]      LABS:                        13.8   5.98  )-----------( 202      ( 03 Jul 2022 07:49 )             38.7     07-03    136  |  97<L>  |  14  ----------------------------<  160<H>  4.2   |  28  |  0.7    Ca    9.1      03 Jul 2022 07:49  Mg     2.0     07-03    TPro  7.0  /  Alb  4.4  /  TBili  0.7  /  DBili  x   /  AST  27  /  ALT  27  /  AlkPhos  115  07-03    PT/INR - ( 02 Jul 2022 00:26 )   PT: 12.90 sec;   INR: 1.12 ratio         PTT - ( 02 Jul 2022 00:26 )  PTT:32.5 sec      RADIOLOGY & ADDITIONAL STUDIES:

## 2022-07-03 NOTE — CONSULT NOTE ADULT - ASSESSMENT
ASSESSMENT:  Rt Foot hematoma with swelling and blisters    RECOMMENDATION:  Wound care - Xeroform/Kerlex/ACE Wrap BID  Elevation

## 2022-07-03 NOTE — PROGRESS NOTE ADULT - SUBJECTIVE AND OBJECTIVE BOX
SHAILA LAL  [facility] [address] ([facility])      Patient is a 76y old  Male who presents with a chief complaint of Cellulitis (03 Jul 2022 11:17)        Interval events:  Patient seen and examined at bedside. No acute events overnight. He says pain is controlled, had foot wrapped by burn and says he is feeling good. No other events.     -PMHx: Prediabetes      -PSHx:        REVIEW OF SYSTEMS:  CONSTITUTIONAL: No fever, weight loss, or fatigue  RESPIRATORY: No cough, wheezing, chills or hemoptysis; No shortness of breath  CARDIOVASCULAR: No chest pain, palpitations, dizziness, or leg swelling  GASTROINTESTINAL: No abdominal or epigastric pain. No nausea, vomiting, or hematemesis; No diarrhea or constipation. No melena or hematochezia.  NEUROLOGICAL: No headaches  LYMPH NODES: No enlarged glands  MUSCULOSKELETAL: No joint pain or swelling; No muscle, back, or extremity pain      T(C): , Max: 37.1 (07-03-22 @ 13:10)  HR: 76 (07-03-22 @ 13:10)  BP: 115/63 (07-03-22 @ 13:10)  RR: 18 (07-03-22 @ 13:10)  SpO2: --  CAPILLARY BLOOD GLUCOSE      POCT Blood Glucose.: 237 mg/dL (03 Jul 2022 11:08)  POCT Blood Glucose.: 149 mg/dL (03 Jul 2022 07:09)  POCT Blood Glucose.: 125 mg/dL (02 Jul 2022 21:23)  POCT Blood Glucose.: 175 mg/dL (02 Jul 2022 16:24)      PHYSICAL EXAM:  GENERAL: NAD, lying in bed comfortably  HEAD:  Atraumatic, Normocephalic  EYES: EOMI, PERRLA, conjunctiva and sclera clear  ENT: Moist mucous membranes  NECK: Supple, No JVD  CHEST/LUNG:  No rales, rhonchi, wheezing, or rubs. Unlabored respirations  HEART: Regular rate and rhythm  ABDOMEN: ; Soft, Nontender, Nondistended.  EXTREMITIES:  R foot dorsal bullae and purple discoloration of entire foot spreading up to calf (decreased from yesterday) , + bruising on lateral knee, tender lateral foot  NERVOUS SYSTEM:  Alert & Oriented X3,  MSK: can walk , uses cane at baseline    LABS:          13.8  5.98  )-------(202          38.7  N=63.7  L=17.7  MCV=98.0    136|97<L>|14  ------------------<160<H>  4.2|28|0.7  eGFR:--  Ca:9.1      PT/INR - ( 02 Jul 2022 00:26 )   PT: 12.90 sec;   INR: 1.12 ratio         PTT - ( 02 Jul 2022 00:26 )  PTT:32.5 sec      Microbiology:    Culture - Blood (collected 07-02-22 @ 03:11)  Source: .Blood Blood-Peripheral  Preliminary Report (07-03-22 @ 12:01):    No growth to date.    Culture - Blood (collected 07-02-22 @ 03:11)  Source: .Blood Blood-Peripheral  Preliminary Report (07-03-22 @ 12:01):    No growth to date.        RADIOLOGY & ADDITIONAL TESTS:  < from: Xray Knee 3 Views, Right (07.02.22 @ 03:27) >  IMPRESSION:    No acute fracture or dislocation.    < end of copied text >  < from: Xray Femur 2 Views, Right (07.02.22 @ 03:27) >  INTERPRETATION/  IMPRESSION:    There is no acute fracture or dislocation. The joint spaces are   maintained. Nonspecific subcutaneous calcifications.    < end of copied text >  IMPRESSION:     No acute fracture or dislocation    < end of copied text >  < from: Xray Foot AP + Lateral + Oblique, Right (07.02.22 @ 03:20) >    IMPRESSION:    No acute fracture or dislocation.    < end of copied text >  < from: CT Angio Lower Extremity w/ IV Cont, Right (07.02.22 @ 01:50) >  IMPRESSION:    Intact arterial flow/triple-vessel runoff to the right lower extremity   without evidence for significant stenosis or acute injury.    Diffuse subcutaneous soft tissue edema and areas of skin thickening   extending from the level of the distal femur through the foot with more   focal 5.0 x 1.57 m hyperdense attenuation over the dorsum mid to hindfoot   which may reflect underlying hematoma. Clinical correlation would be   needed to exclude any signs of cellulitis or infection.    No definite acute osseous abnormality is identified.      Medications:  acetaminophen     Tablet .. 650 milliGRAM(s) Oral every 6 hours PRN  acetaminophen     Tablet .. 650 milliGRAM(s) Oral every 6 hours PRN  aluminum hydroxide/magnesium hydroxide/simethicone Suspension 30 milliLiter(s) Oral every 4 hours PRN  atorvastatin 10 milliGRAM(s) Oral at bedtime  ceFAZolin   IVPB 1000 milliGRAM(s) IV Intermittent every 8 hours  dextrose 5%. 1000 milliLiter(s) IV Continuous <Continuous>  dextrose 5%. 1000 milliLiter(s) IV Continuous <Continuous>  dextrose 50% Injectable 25 Gram(s) IV Push once  dextrose 50% Injectable 12.5 Gram(s) IV Push once  dextrose 50% Injectable 25 Gram(s) IV Push once  dextrose Oral Gel 15 Gram(s) Oral once PRN  finasteride 5 milliGRAM(s) Oral daily  glucagon  Injectable 1 milliGRAM(s) IntraMuscular once  heparin   Injectable 5000 Unit(s) SubCutaneous every 8 hours  insulin glargine Injectable (LANTUS) 15 Unit(s) SubCutaneous at bedtime  insulin lispro (ADMELOG) corrective regimen sliding scale   SubCutaneous three times a day before meals  insulin lispro Injectable (ADMELOG) 5 Unit(s) SubCutaneous three times a day before meals  lisinopril 10 milliGRAM(s) Oral daily  melatonin 3 milliGRAM(s) Oral at bedtime PRN  ondansetron Injectable 4 milliGRAM(s) IV Push every 8 hours PRN  tamsulosin 0.4 milliGRAM(s) Oral at bedtime  triamterene 37.5 mG/hydrochlorothiazide 25 mG Tablet 1 Tablet(s) Oral daily

## 2022-07-04 LAB
A1C WITH ESTIMATED AVERAGE GLUCOSE RESULT: 8.2 % — HIGH (ref 4–5.6)
ALBUMIN SERPL ELPH-MCNC: 4.7 G/DL — SIGNIFICANT CHANGE UP (ref 3.5–5.2)
ALP SERPL-CCNC: 121 U/L — HIGH (ref 30–115)
ALT FLD-CCNC: 22 U/L — SIGNIFICANT CHANGE UP (ref 0–41)
ANION GAP SERPL CALC-SCNC: 12 MMOL/L — SIGNIFICANT CHANGE UP (ref 7–14)
AST SERPL-CCNC: 20 U/L — SIGNIFICANT CHANGE UP (ref 0–41)
BASOPHILS # BLD AUTO: 0.04 K/UL — SIGNIFICANT CHANGE UP (ref 0–0.2)
BASOPHILS NFR BLD AUTO: 0.5 % — SIGNIFICANT CHANGE UP (ref 0–1)
BILIRUB SERPL-MCNC: 0.8 MG/DL — SIGNIFICANT CHANGE UP (ref 0.2–1.2)
BUN SERPL-MCNC: 15 MG/DL — SIGNIFICANT CHANGE UP (ref 10–20)
CALCIUM SERPL-MCNC: 9.2 MG/DL — SIGNIFICANT CHANGE UP (ref 8.5–10.1)
CHLORIDE SERPL-SCNC: 96 MMOL/L — LOW (ref 98–110)
CO2 SERPL-SCNC: 27 MMOL/L — SIGNIFICANT CHANGE UP (ref 17–32)
CREAT SERPL-MCNC: 0.6 MG/DL — LOW (ref 0.7–1.5)
EGFR: 100 ML/MIN/1.73M2 — SIGNIFICANT CHANGE UP
EOSINOPHIL # BLD AUTO: 0.14 K/UL — SIGNIFICANT CHANGE UP (ref 0–0.7)
EOSINOPHIL NFR BLD AUTO: 1.8 % — SIGNIFICANT CHANGE UP (ref 0–8)
ESTIMATED AVERAGE GLUCOSE: 189 MG/DL — HIGH (ref 68–114)
GLUCOSE BLDC GLUCOMTR-MCNC: 158 MG/DL — HIGH (ref 70–99)
GLUCOSE BLDC GLUCOMTR-MCNC: 160 MG/DL — HIGH (ref 70–99)
GLUCOSE BLDC GLUCOMTR-MCNC: 215 MG/DL — HIGH (ref 70–99)
GLUCOSE BLDC GLUCOMTR-MCNC: 319 MG/DL — HIGH (ref 70–99)
GLUCOSE SERPL-MCNC: 158 MG/DL — HIGH (ref 70–99)
HCT VFR BLD CALC: 42.1 % — SIGNIFICANT CHANGE UP (ref 42–52)
HCV AB S/CO SERPL IA: 0.09 COI — SIGNIFICANT CHANGE UP
HCV AB SERPL-IMP: SIGNIFICANT CHANGE UP
HGB BLD-MCNC: 14.7 G/DL — SIGNIFICANT CHANGE UP (ref 14–18)
IMM GRANULOCYTES NFR BLD AUTO: 0.9 % — HIGH (ref 0.1–0.3)
LYMPHOCYTES # BLD AUTO: 1.1 K/UL — LOW (ref 1.2–3.4)
LYMPHOCYTES # BLD AUTO: 14.5 % — LOW (ref 20.5–51.1)
MAGNESIUM SERPL-MCNC: 2.1 MG/DL — SIGNIFICANT CHANGE UP (ref 1.8–2.4)
MCHC RBC-ENTMCNC: 33.9 PG — HIGH (ref 27–31)
MCHC RBC-ENTMCNC: 34.9 G/DL — SIGNIFICANT CHANGE UP (ref 32–37)
MCV RBC AUTO: 97 FL — HIGH (ref 80–94)
MONOCYTES # BLD AUTO: 0.99 K/UL — HIGH (ref 0.1–0.6)
MONOCYTES NFR BLD AUTO: 13.1 % — HIGH (ref 1.7–9.3)
NEUTROPHILS # BLD AUTO: 5.24 K/UL — SIGNIFICANT CHANGE UP (ref 1.4–6.5)
NEUTROPHILS NFR BLD AUTO: 69.2 % — SIGNIFICANT CHANGE UP (ref 42.2–75.2)
NRBC # BLD: 0 /100 WBCS — SIGNIFICANT CHANGE UP (ref 0–0)
PLATELET # BLD AUTO: 238 K/UL — SIGNIFICANT CHANGE UP (ref 130–400)
POTASSIUM SERPL-MCNC: 4 MMOL/L — SIGNIFICANT CHANGE UP (ref 3.5–5)
POTASSIUM SERPL-SCNC: 4 MMOL/L — SIGNIFICANT CHANGE UP (ref 3.5–5)
PROT SERPL-MCNC: 7.4 G/DL — SIGNIFICANT CHANGE UP (ref 6–8)
RBC # BLD: 4.34 M/UL — LOW (ref 4.7–6.1)
RBC # FLD: 12.4 % — SIGNIFICANT CHANGE UP (ref 11.5–14.5)
SODIUM SERPL-SCNC: 135 MMOL/L — SIGNIFICANT CHANGE UP (ref 135–146)
WBC # BLD: 7.58 K/UL — SIGNIFICANT CHANGE UP (ref 4.8–10.8)
WBC # FLD AUTO: 7.58 K/UL — SIGNIFICANT CHANGE UP (ref 4.8–10.8)

## 2022-07-04 PROCEDURE — 93971 EXTREMITY STUDY: CPT | Mod: 26,RT

## 2022-07-04 PROCEDURE — 99232 SBSQ HOSP IP/OBS MODERATE 35: CPT

## 2022-07-04 RX ADMIN — Medication 5 UNIT(S): at 11:41

## 2022-07-04 RX ADMIN — Medication 3 MILLIGRAM(S): at 22:14

## 2022-07-04 RX ADMIN — Medication 1: at 08:10

## 2022-07-04 RX ADMIN — Medication 100 MILLIGRAM(S): at 13:22

## 2022-07-04 RX ADMIN — Medication 5 UNIT(S): at 16:49

## 2022-07-04 RX ADMIN — Medication 650 MILLIGRAM(S): at 22:15

## 2022-07-04 RX ADMIN — HEPARIN SODIUM 5000 UNIT(S): 5000 INJECTION INTRAVENOUS; SUBCUTANEOUS at 13:23

## 2022-07-04 RX ADMIN — Medication 100 MILLIGRAM(S): at 22:12

## 2022-07-04 RX ADMIN — FINASTERIDE 5 MILLIGRAM(S): 5 TABLET, FILM COATED ORAL at 11:49

## 2022-07-04 RX ADMIN — Medication 1 TABLET(S): at 05:56

## 2022-07-04 RX ADMIN — Medication 4: at 11:41

## 2022-07-04 RX ADMIN — ATORVASTATIN CALCIUM 10 MILLIGRAM(S): 80 TABLET, FILM COATED ORAL at 22:12

## 2022-07-04 RX ADMIN — TAMSULOSIN HYDROCHLORIDE 0.4 MILLIGRAM(S): 0.4 CAPSULE ORAL at 22:12

## 2022-07-04 RX ADMIN — LISINOPRIL 10 MILLIGRAM(S): 2.5 TABLET ORAL at 05:55

## 2022-07-04 RX ADMIN — INSULIN GLARGINE 15 UNIT(S): 100 INJECTION, SOLUTION SUBCUTANEOUS at 22:12

## 2022-07-04 RX ADMIN — Medication 100 MILLIGRAM(S): at 05:54

## 2022-07-04 RX ADMIN — Medication 5 UNIT(S): at 08:09

## 2022-07-04 RX ADMIN — HEPARIN SODIUM 5000 UNIT(S): 5000 INJECTION INTRAVENOUS; SUBCUTANEOUS at 05:56

## 2022-07-04 RX ADMIN — HEPARIN SODIUM 5000 UNIT(S): 5000 INJECTION INTRAVENOUS; SUBCUTANEOUS at 22:12

## 2022-07-04 RX ADMIN — Medication 1: at 17:41

## 2022-07-04 NOTE — PHYSICAL THERAPY INITIAL EVALUATION ADULT - FOLLOWS COMMANDS/ANSWERS QUESTIONS, REHAB EVAL
"                                           Progress Note    Patient Name: Nathaly Bullard  Date: 10/25/2021         Service Type: Individual      Session Start Time: 08:09 AM  Session End Time: 08:59 AM     Session Length:  51 min     Session #: 11th session with new therapist.    Attendees: Client attended alone    Service Modality:  Phone Visit:      Provider verified identity through the following two step process.  Patient provided:  Patient  and Patient address    The patient has been notified of the following:      \"We have found that certain health care needs can be provided without the need for a face to face visit.  This service lets us provide the care you need with a phone conversation.       I will have full access to your [facility] medical record during this entire phone call.   I will be taking notes for your medical record.      Since this is like an office visit, we will bill your insurance company for this service.       There are potential benefits and risks of telephone visits (e.g. limits to patient confidentiality) that differ from in-person visits.?Confidentiality still applies for telephone services, and nobody will record the visit.  It is important to be in a quiet, private space that is free of distractions (including cell phone or other devices) during the visit.??      If during the course of the call I believe a telephone visit is not appropriate, you will not be charged for this service\"     Consent has been obtained for this service by care team member: Yes      Treatment Plan Last Reviewed: 2021 - Will update in November  PHQ-9 / KATY-7 : 3/8    DATA  Interactive Complexity: No  Crisis: No       Progress Since Last Session (Related to Symptoms / Goals / Homework):   Symptoms: Improving , Patient reports that she has been clearly communicating and maintaining health boundaries with her family members.     Homework: Achieved / completed to satisfaction      Episode of " "Care Goals: Satisfactory progress - ACTION (Actively working towards change); Intervened by reinforcing change plan / affirming steps taken     Current / Ongoing Stressors and Concerns:   Patient presents with Adjustment disorder with mixed anxiety and depressed mood. Patient states: \"I'm pretty good\". The patient reports that she \"had a real nice relaxing weekend with my friend.\" The patient reports that \"since he has been in my life I have been more relaxed and have better at setting boundaries.\"  Patient processed through her thoughts and emotions related to: her weekend, family stressors, her upcoming trip, her recent interpersonal interactions and her interpersonal relationships. Patient reports that she has been communicating and enforcing her boundaries with her family members. The patient continues to appear as though she has a strong support system comprised of her phil[facility] community members.     Treatment Objective(s) Addressed in This Session:  Client will practice setting boundaries at least 1 time over the next week.  Client will \"take time for herself\" each week to practice self-care.   ?   Intervention:  Therapist utilized: Client centered, Validation, Normalization, Interpersonal and Psychodynamic therapeutic interventions.  Assign and review homework in session.  Psycho-education on the importance of: sleep, self-care, and healthy interpersonal boundaries     ASSESSMENT: Current Emotional / Mental Status (status of significant symptoms):   Risk status (Self / Other harm or suicidal ideation)   Patient denies current fears or concerns for personal safety.   Patient denies current or recent suicidal ideation or behaviors.   Patient denies current or recent homicidal ideation or behaviors.   Patient denies current or recent self injurious behavior or ideation.   Patient denies other safety concerns.   Patient reports there has been no change in risk factors since their last session.     Patient reports " there has been no change in protective factors since their last session.     Recommended that patient call 911 or go to the local ED should there be a change in any of these risk factors.     Appearance:   Appropriate  Unable to asses over the phone.     Eye Contact:   Unable to asses over the phone.     Psychomotor Behavior: Normal  Unable to asses over the phone.     Attitude:   Cooperative  Interested Friendly Pleasant Attentive   Orientation:   Person Place Time Situation All   Speech    Rate / Production: Normal/ Responsive Normal     Volume:  Normal    Mood:    Normal Tired   Affect:    Appropriate    Thought Content:  Clear    Thought Form:  Coherent  Logical    Insight:    Good  and Intellectual Insight     Medication Review:   No changes to current psychiatric medication(s)     Medication Compliance:   Yes     Changes in Health Issues:   None reported     Chemical Use Review:   Substance Use: Chemical use reviewed, no active concerns identified      Tobacco Use: No current tobacco use.      Diagnosis:  No diagnosis found.    Collateral Reports Completed:   Not Applicable    PLAN: (Patient Tasks / Therapist Tasks / Other)  Patient plans to continue to communicate and enforce her boundaries.   Patient will try to get 7-9 hours of sleep each night.   Patient will return for follow up 11/02/2021.        Miriam Coello, [facility]                                                         ______________________________________________________________________                                                   Treatment Plan    Client's Name: Nathaly Bullard  YOB: 1965    Date: 8/18/2021    DSM-V Diagnoses: Adjustment Disorders  309.28 (F43.23) With mixed anxiety and depressed mood  Psychosocial / Contextual Factors: Patient currently in remission from cancer. Patient lives alone and is dealing with interpersonal stressors.   WHODAS: Will update next session    Referral / Collaboration:  Referral to  "another professional/service is not indicated at this time..    Anticipated number of sessions this episode of care: 12      MeasurableTreatment Goal(s) related to diagnosis / functional impairment(s)  Goal 1: Client will establish and maintain healthy interpersonal boundaries.     I will know I've met my goal when I no longer have things I need to process.      Objective #A  Client will identify boundaries she would like to establish with others.  Status: New - Date: 8/18/2021     Intervention(s)  Therapist will utilize the following therapy techniques: Psychoeducation, DBT, and Motivational Interviewing.  Assign and review homework in session.       Objective #B  Client will practice setting boundaries at least 1 time over the next week.  Status: New - Date: 8/18/2021     Intervention(s)  Therapist will utilize the following therapy techniques: Psychoeducation, DBT, and Motivational Interviewing..  Assign and review homework in session..    Objective #C  Client will \"take time for herself\" each week to practice self-care.   Status: New - Date: 8/18/2021     Intervention(s)  Therapist will teach the benefits of practicing self-care.               Assign and review homework in session.   Therapist will utilize the following therapy techniques: Psychoeducation, DBT, and Motivational Interviewing..      Goal 2: Client will get 7-9 hours of quality sleep each night.     I will know I've met my goal when I regularly get good sleep.      Objective #A Client will learn about sleep hygiene.    Status: New - Date: 8/18/2021       Intervention(s)  Therapist will provide psychoeducation and educational materials on sleep hygiene.    Objective #B  Client will identify 3 sleep hygiene practices.    Status: New - Date: 8/18/2021     Intervention(s)   Therapist will provide psychoeducation and educational materials on sleep hygiene..    Objective #C  Client will sleep at least 7-9 hours per night 85% of the time.   Status: New - " Date: 8/18/2021     Intervention(s)  Therapist will assign homework and review in session. .        Patient has reviewed and agreed to the above plan.      Miriam Coello, [facility]  August 18, 2021   100% of the time

## 2022-07-04 NOTE — PHYSICAL THERAPY INITIAL EVALUATION ADULT - PERTINENT HX OF CURRENT PROBLEM, REHAB EVAL
Presenting for R foot pain that has been constant for the past 3 days. States that the swelling and discoloration to the foot have worsened since then, suspect cellulitis, X-rays negative.

## 2022-07-04 NOTE — PROGRESS NOTE ADULT - ASSESSMENT
76 Y M with pmh of pre-DM, HTN, BPH presents to ED after hurting his right foot on Monday. He says he was walking and tripped, rolled his right ankle/foot, and since then has had significant pain and swelling that is worsening. He went to [facility] in [place], had xrays done (which he says were negative), and given oral antibiotics. However, has had worsening pain and swelling, now with worsening bruising and inability to ambulate. Denies fevers, previous infections, loss of sensation.     #RLE cellulitis, now with bullae and significant soft tissue swelling   -SIRS not present on admission  -continue ancef 1g iv q8h per ID- eval appreciated  -tylenol prn for pain  -distal pulses present  -burn consult appreciated- Wound care - Xeroform/Kerlex/ACE Wrap BID, Elevation  -va duplex RLE venous negative    # DM - uncontrolled  - f/u finger sticks- controlled  - f/u A1c  - home on metformin 1000 qD   - insulin naive    - c/w glargine 15 qhs , lispro 5 with meals + SS (low)    # HTN    # HLD  - c/w home atorvastatin 10 at bed time     # BPH   - c/w home flomax and finasteride    #DVT PPx- LVX 40mg sq qhs  #GI PPx- None   #Diet- DASH/TLC/CC  #CHG  #Activity- AAT; PT/Rehab   #Dispo- Acute; pending improvement of swelling, PT/rehab; possibly home in next 24-48 hrs  #Code- FULL.    76 Y M with pmh of pre-DM, HTN, BPH presents to ED after hurting his right foot on Monday. He says he was walking and tripped, rolled his right ankle/foot, and since then has had significant pain and swelling that is worsening. He went to [facility] in [place], had xrays done (which he says were negative), and given oral antibiotics. However, has had worsening pain and swelling, now with worsening bruising and inability to ambulate. Denies fevers, previous infections, loss of sensation.     #RLE cellulitis, now with bullae and significant soft tissue swelling   - SIRS not present on admission  - continue ancef 1g iv q8h per ID- eval appreciated  - tylenol prn for pain  - distal pulses present  - burn consult appreciated- Wound care - Xeroform/Kerlex/ACE Wrap BID, Elevation, will follow up Burn  - va duplex RLE venous negative    # DM - uncontrolled, A1c 8.2  - f/u finger sticks  - home on metformin 1000 qD   - insulin naive    - on glargine 15 qhs , lispro 5 with meals + SS (low), will adjust if continues to uptrend    # HTN, controlled  - on lisinopril and HCTZ/triamterene    # HLD  - c/w home atorvastatin 10 at bed time     # BPH   - c/w home flomax and finasteride    #DVT PPx- heparin subcut q8h  #GI PPx- None   #Diet- DASH/TLC/CC  #Activity- AAT; PT/Rehab   #Code- FULL.

## 2022-07-04 NOTE — PHYSICAL THERAPY INITIAL EVALUATION ADULT - LEVEL OF INDEPENDENCE: SIT/STAND, REHAB EVAL
Pt c/o 10/10 R foot pain upon placing foot on ground when sitting at EOB. Pt with +darco shoe donned. Attempted to initiate sit to stand x 2 trials, however pt declined both times upon leaning forward from EOB both times. Pt reports unable to tolerate standing at this time due to10/10 R foot pain, resolves at rest.

## 2022-07-04 NOTE — PROGRESS NOTE ADULT - SUBJECTIVE AND OBJECTIVE BOX
HALI SHAILA  76y Male    Patient is a 76y old  Male who presents with a chief complaint of 76y    HPI:  75 yo male   PMH :  pre-diabetes , HT   Presenting for R foot pain that has been constant for the past 3 days.   States he fell on the sidewalk and injured his R knee and foot 3 days ago. It was a mechanical fall with no LOC, dizziness   Went to an ED at [facility] in [facility], had xrays done at the time, states they were negative and was given oral antibiotics  States that the swelling and discoloration to the foot have worsened since then.   Denies : fevers, previous infections, hx of known vascular disease    In the ED  Bp : 170/85  HR : 75   RR: 18  T : 97.9  O2 : 98% on RA    Gluc : 341 , WBC 5    CT Angio LE R :   Intact arterial flow/triple-vessel runoff to the right lower extremity  without evidence for significant stenosis or acute injury.  Diffuse subcutaneous soft tissue edema and areas of skin thickening  extending from the level of the distal femur through the foot - possible cellulitis   Focal 5.0 x 1.57 m hyperdense attenuation over the dorsum mid to hindfoot - possible hematoma   No definite acute osseous abnormality is identified.    Started on Ancef 1g q8 in ED    Patient pharmacy : [facility] [address] [facility] : closed on the weekend , reopens Monday 9am-6pm ([phone])  Please call to confirm med recc : Per patient he takes 1bp med and 1 water pill, can not remember the names          (02 Jul 2022 10:13)      INTERVAL HPI/OVERNIGHT EVENTS:    ROS: Pt denies fever, chills, SOB, palpitations, nausea, vomiting, abdominal pain, dysuria, diarrhea, constipation, rash, muscle or joint pain.    Overnight events: No acute events overnight.    Vital Signs Last 24 Hrs  T(C): 36.7 (05 Jul 2022 14:49), Max: 37.2 (05 Jul 2022 05:32)  T(F): 98 (05 Jul 2022 14:49), Max: 98.9 (05 Jul 2022 05:32)  HR: 79 (05 Jul 2022 14:49) (77 - 79)  BP: 120/69 (05 Jul 2022 14:49) (114/78 - 120/69)  BP(mean): --  RR: 20 (05 Jul 2022 14:49) (18 - 20)  SpO2: --    No Known Allergies      MEDICATIONS  (STANDING):  atorvastatin 10 milliGRAM(s) Oral at bedtime  ceFAZolin   IVPB 1000 milliGRAM(s) IV Intermittent every 8 hours  dextrose 5%. 1000 milliLiter(s) (50 mL/Hr) IV Continuous <Continuous>  dextrose 5%. 1000 milliLiter(s) (100 mL/Hr) IV Continuous <Continuous>  dextrose 50% Injectable 25 Gram(s) IV Push once  dextrose 50% Injectable 12.5 Gram(s) IV Push once  dextrose 50% Injectable 25 Gram(s) IV Push once  finasteride 5 milliGRAM(s) Oral daily  glucagon  Injectable 1 milliGRAM(s) IntraMuscular once  heparin   Injectable 5000 Unit(s) SubCutaneous every 8 hours  insulin glargine Injectable (LANTUS) 15 Unit(s) SubCutaneous at bedtime  insulin lispro (ADMELOG) corrective regimen sliding scale   SubCutaneous three times a day before meals  insulin lispro Injectable (ADMELOG) 5 Unit(s) SubCutaneous three times a day before meals  lisinopril 10 milliGRAM(s) Oral daily  tamsulosin 0.4 milliGRAM(s) Oral at bedtime  triamterene 37.5 mG/hydrochlorothiazide 25 mG Tablet 1 Tablet(s) Oral daily    MEDICATIONS  (PRN):  acetaminophen     Tablet .. 650 milliGRAM(s) Oral every 6 hours PRN Temp greater or equal to 38C (100.4F), Mild Pain (1 - 3)  acetaminophen     Tablet .. 650 milliGRAM(s) Oral every 6 hours PRN Temp greater or equal to 38C (100.4F), Mild Pain (1 - 3), Moderate Pain (4 - 6)  aluminum hydroxide/magnesium hydroxide/simethicone Suspension 30 milliLiter(s) Oral every 4 hours PRN Dyspepsia  dextrose Oral Gel 15 Gram(s) Oral once PRN Blood Glucose LESS THAN 70 milliGRAM(s)/deciliter  melatonin 3 milliGRAM(s) Oral at bedtime PRN Insomnia  ondansetron Injectable 4 milliGRAM(s) IV Push every 8 hours PRN Nausea and/or Vomiting      PHYSICAL EXAM:  General Appearance: Well appearing, normal for age and gender. 	  Neck: Normal JVP, no bruit.   Eyes: Conjunctiva clear, Extra Ocular muscles intact.   Cardiovascular: Regular rate and rhythm S1 S2, No JVD, No murmurs.  Respiratory: Lungs clear to auscultation. No wheezes, rales or rhonchi.  Psychiatry: Alert and oriented x 3, Mood & affect appropriate.  Gastrointestinal:  Soft, Non-tender, Non-distended.  Neurologic: Non-focal deficits.  Musculoskeletal/ extremities: Normal range of motion, No clubbing, cyanosis or edema.  Vascular: Peripheral pulses palpable 2+ bilaterally.  Skin/Integumen: No rashes, No ecchymoses, No cyanosis	.    LABS:                        14.3   6.43  )-----------( 218      ( 05 Jul 2022 08:37 )             40.0     07-05    138  |  99  |  17  ----------------------------<  160<H>  4.3   |  25  |  0.7    Ca    9.1      05 Jul 2022 08:37  Phos  3.2     07-05  Mg     2.2     07-05    TPro  7.4  /  Alb  4.7  /  TBili  0.8  /  DBili  x   /  AST  20  /  ALT  22  /  AlkPhos  121<H>  07-04        Culture - Blood (collected 02 Jul 2022 18:28)  Source: .Blood None  Preliminary Report (04 Jul 2022 05:01):    No growth to date.        RADIOLOGY & ADDITIONAL TESTS:  < from: VA Duplex Lower Ext Vein Scan, Right (07.04.22 @ 09:27) >  No evidence of deep venous thrombosis or superficialthrombophlebitis in   the right lower extremity.               HALI SHAILA  76y Male    Patient is a 76y old  Male who presents with a chief complaint of right foot pain    HPI:  75 yo male   PMH :  pre-diabetes , HT   Presenting for R foot pain that has been constant for the past 3 days.   States he fell on the sidewalk and injured his R knee and foot 3 days ago. It was a mechanical fall with no LOC, dizziness   Went to an ED at [facility] in [facility], had xrays done at the time, states they were negative and was given oral antibiotics  States that the swelling and discoloration to the foot have worsened since then.   Denies : fevers, previous infections, hx of known vascular disease    In the ED  Bp : 170/85  HR : 75   RR: 18  T : 97.9  O2 : 98% on RA    Gluc : 341 , WBC 5    CT Angio LE R :   Intact arterial flow/triple-vessel runoff to the right lower extremity  without evidence for significant stenosis or acute injury.  Diffuse subcutaneous soft tissue edema and areas of skin thickening  extending from the level of the distal femur through the foot - possible cellulitis   Focal 5.0 x 1.57 m hyperdense attenuation over the dorsum mid to hindfoot - possible hematoma   No definite acute osseous abnormality is identified.    Started on Ancef 1g q8 in ED    Patient pharmacy : [facility] [facility] : closed on the weekend , reopens Monday 9am-6pm ([phone])  Please call to confirm med recc : Per patient he takes 1bp med and 1 water pill, can not remember the names          (02 Jul 2022 10:13)      INTERVAL HPI/OVERNIGHT EVENTS:    ROS: Pt denies fever, chills, SOB, palpitations, nausea, vomiting, abdominal pain, dysuria, diarrhea, constipation, rash, muscle or joint pain.    Overnight events: No acute events overnight.    Vital Signs Last 24 Hrs  T(F): 97.9 F  HR: 73-85  BP: 98//70  RR: 18  SpO2: --    No Known Allergies      MEDICATIONS  (STANDING):  atorvastatin 10 milliGRAM(s) Oral at bedtime  ceFAZolin   IVPB 1000 milliGRAM(s) IV Intermittent every 8 hours  dextrose 5%. 1000 milliLiter(s) (50 mL/Hr) IV Continuous <Continuous>  dextrose 5%. 1000 milliLiter(s) (100 mL/Hr) IV Continuous <Continuous>  dextrose 50% Injectable 25 Gram(s) IV Push once  dextrose 50% Injectable 12.5 Gram(s) IV Push once  dextrose 50% Injectable 25 Gram(s) IV Push once  finasteride 5 milliGRAM(s) Oral daily  glucagon  Injectable 1 milliGRAM(s) IntraMuscular once  heparin   Injectable 5000 Unit(s) SubCutaneous every 8 hours  insulin glargine Injectable (LANTUS) 15 Unit(s) SubCutaneous at bedtime  insulin lispro (ADMELOG) corrective regimen sliding scale   SubCutaneous three times a day before meals  insulin lispro Injectable (ADMELOG) 5 Unit(s) SubCutaneous three times a day before meals  lisinopril 10 milliGRAM(s) Oral daily  tamsulosin 0.4 milliGRAM(s) Oral at bedtime  triamterene 37.5 mG/hydrochlorothiazide 25 mG Tablet 1 Tablet(s) Oral daily    MEDICATIONS  (PRN):  acetaminophen     Tablet .. 650 milliGRAM(s) Oral every 6 hours PRN Temp greater or equal to 38C (100.4F), Mild Pain (1 - 3)  acetaminophen     Tablet .. 650 milliGRAM(s) Oral every 6 hours PRN Temp greater or equal to 38C (100.4F), Mild Pain (1 - 3), Moderate Pain (4 - 6)  aluminum hydroxide/magnesium hydroxide/simethicone Suspension 30 milliLiter(s) Oral every 4 hours PRN Dyspepsia  dextrose Oral Gel 15 Gram(s) Oral once PRN Blood Glucose LESS THAN 70 milliGRAM(s)/deciliter  melatonin 3 milliGRAM(s) Oral at bedtime PRN Insomnia  ondansetron Injectable 4 milliGRAM(s) IV Push every 8 hours PRN Nausea and/or Vomiting      PHYSICAL EXAM:  General Appearance: NAD, normal for age and gender.   EMNT: Moist oral mucosa.   Neck: Normal JVP, no bruit.   Eyes: Conjunctiva clear, Extra Ocular muscles intact.   Cardiovascular: Regular rate and rhythm S1 S2, No JVD, No murmurs.  Respiratory: Lungs clear to auscultation. No wheezes, rales or rhonchi.  Psychiatry: Alert and oriented x 3, Mood & affect appropriate. Hard of hearing but able to lip read.	  Gastrointestinal:  Soft, Non-tender, Non-distended.  Neurologic: Non-focal deficits.  Musculoskeletal/ extremities: Normal range of motion, No clubbing, cyanosis. Swollen right foot with wound and dressing.  Vascular: Peripheral pulses palpable bilaterally.  Skin/Integumen: No rashes, No ecchymoses, No cyanosis.      LABS:                        14.7   7.58  )-----------(238      ( 04 Jul 2022 08:16)             42.1    07-04    135  |  96  |  15  ----------------------------<  158<H>  4.0   |  27  |  0.6    Ca    9.2      04 Jul 2022 08:16  Mg     2.1     07-05    TPro  7.4  /  Alb  4.7  /  TBili  0.8  /  DBili  x   /  AST  20  /  ALT  22  /  AlkPhos  121<H>  07-04        RADIOLOGY & ADDITIONAL TESTS:  < from: VA Duplex Lower Ext Vein Scan, Right (07.04.22 @ 09:27) >  No evidence of deep venous thrombosis or superficialthrombophlebitis in   the right lower extremity.

## 2022-07-04 NOTE — PHYSICAL THERAPY INITIAL EVALUATION ADULT - GENERAL OBSERVATIONS, REHAB EVAL
1340 - 1400 Pt rec supine in bed with +R foot ace wraped, +IV, in NAD. Pt reports minimal pain at rest, but severe 10/10 R foot pain upon placing foot on ground, despite PT obtaining darco shoes for pt. Limited assessment at this time. Pt reports that when pain is decreased, he has been able to amb to bathroom and back without assist, with use of personal SC at b/s.

## 2022-07-05 LAB
ANION GAP SERPL CALC-SCNC: 14 MMOL/L — SIGNIFICANT CHANGE UP (ref 7–14)
BASOPHILS # BLD AUTO: 0.06 K/UL — SIGNIFICANT CHANGE UP (ref 0–0.2)
BASOPHILS NFR BLD AUTO: 0.9 % — SIGNIFICANT CHANGE UP (ref 0–1)
BUN SERPL-MCNC: 17 MG/DL — SIGNIFICANT CHANGE UP (ref 10–20)
CALCIUM SERPL-MCNC: 9.1 MG/DL — SIGNIFICANT CHANGE UP (ref 8.5–10.1)
CHLORIDE SERPL-SCNC: 99 MMOL/L — SIGNIFICANT CHANGE UP (ref 98–110)
CO2 SERPL-SCNC: 25 MMOL/L — SIGNIFICANT CHANGE UP (ref 17–32)
CREAT SERPL-MCNC: 0.7 MG/DL — SIGNIFICANT CHANGE UP (ref 0.7–1.5)
EGFR: 95 ML/MIN/1.73M2 — SIGNIFICANT CHANGE UP
EOSINOPHIL # BLD AUTO: 0.25 K/UL — SIGNIFICANT CHANGE UP (ref 0–0.7)
EOSINOPHIL NFR BLD AUTO: 3.9 % — SIGNIFICANT CHANGE UP (ref 0–8)
GLUCOSE BLDC GLUCOMTR-MCNC: 170 MG/DL — HIGH (ref 70–99)
GLUCOSE BLDC GLUCOMTR-MCNC: 171 MG/DL — HIGH (ref 70–99)
GLUCOSE BLDC GLUCOMTR-MCNC: 178 MG/DL — HIGH (ref 70–99)
GLUCOSE BLDC GLUCOMTR-MCNC: 189 MG/DL — HIGH (ref 70–99)
GLUCOSE BLDC GLUCOMTR-MCNC: 220 MG/DL — HIGH (ref 70–99)
GLUCOSE SERPL-MCNC: 160 MG/DL — HIGH (ref 70–99)
HCT VFR BLD CALC: 40 % — LOW (ref 42–52)
HGB BLD-MCNC: 14.3 G/DL — SIGNIFICANT CHANGE UP (ref 14–18)
IMM GRANULOCYTES NFR BLD AUTO: 1.2 % — HIGH (ref 0.1–0.3)
LYMPHOCYTES # BLD AUTO: 0.99 K/UL — LOW (ref 1.2–3.4)
LYMPHOCYTES # BLD AUTO: 15.4 % — LOW (ref 20.5–51.1)
MAGNESIUM SERPL-MCNC: 2.2 MG/DL — SIGNIFICANT CHANGE UP (ref 1.8–2.4)
MCHC RBC-ENTMCNC: 34.8 PG — HIGH (ref 27–31)
MCHC RBC-ENTMCNC: 35.8 G/DL — SIGNIFICANT CHANGE UP (ref 32–37)
MCV RBC AUTO: 97.3 FL — HIGH (ref 80–94)
MONOCYTES # BLD AUTO: 1.01 K/UL — HIGH (ref 0.1–0.6)
MONOCYTES NFR BLD AUTO: 15.7 % — HIGH (ref 1.7–9.3)
NEUTROPHILS # BLD AUTO: 4.04 K/UL — SIGNIFICANT CHANGE UP (ref 1.4–6.5)
NEUTROPHILS NFR BLD AUTO: 62.9 % — SIGNIFICANT CHANGE UP (ref 42.2–75.2)
NRBC # BLD: 0 /100 WBCS — SIGNIFICANT CHANGE UP (ref 0–0)
PHOSPHATE SERPL-MCNC: 3.2 MG/DL — SIGNIFICANT CHANGE UP (ref 2.1–4.9)
PLATELET # BLD AUTO: 218 K/UL — SIGNIFICANT CHANGE UP (ref 130–400)
POTASSIUM SERPL-MCNC: 4.3 MMOL/L — SIGNIFICANT CHANGE UP (ref 3.5–5)
POTASSIUM SERPL-SCNC: 4.3 MMOL/L — SIGNIFICANT CHANGE UP (ref 3.5–5)
RBC # BLD: 4.11 M/UL — LOW (ref 4.7–6.1)
RBC # FLD: 12.6 % — SIGNIFICANT CHANGE UP (ref 11.5–14.5)
SODIUM SERPL-SCNC: 138 MMOL/L — SIGNIFICANT CHANGE UP (ref 135–146)
WBC # BLD: 6.43 K/UL — SIGNIFICANT CHANGE UP (ref 4.8–10.8)
WBC # FLD AUTO: 6.43 K/UL — SIGNIFICANT CHANGE UP (ref 4.8–10.8)

## 2022-07-05 PROCEDURE — 99232 SBSQ HOSP IP/OBS MODERATE 35: CPT

## 2022-07-05 PROCEDURE — 99231 SBSQ HOSP IP/OBS SF/LOW 25: CPT | Mod: GC

## 2022-07-05 RX ORDER — INSULIN GLARGINE 100 [IU]/ML
20 INJECTION, SOLUTION SUBCUTANEOUS AT BEDTIME
Refills: 0 | Status: DISCONTINUED | OUTPATIENT
Start: 2022-07-05 | End: 2022-07-07

## 2022-07-05 RX ORDER — INSULIN LISPRO 100/ML
VIAL (ML) SUBCUTANEOUS
Refills: 0 | Status: DISCONTINUED | OUTPATIENT
Start: 2022-07-05 | End: 2022-07-07

## 2022-07-05 RX ORDER — BACITRACIN ZINC 500 UNIT/G
1 OINTMENT IN PACKET (EA) TOPICAL
Refills: 0 | Status: DISCONTINUED | OUTPATIENT
Start: 2022-07-05 | End: 2022-07-07

## 2022-07-05 RX ORDER — INSULIN LISPRO 100/ML
7 VIAL (ML) SUBCUTANEOUS
Refills: 0 | Status: DISCONTINUED | OUTPATIENT
Start: 2022-07-05 | End: 2022-07-07

## 2022-07-05 RX ADMIN — Medication 1 APPLICATION(S): at 17:39

## 2022-07-05 RX ADMIN — Medication 5 UNIT(S): at 16:50

## 2022-07-05 RX ADMIN — TAMSULOSIN HYDROCHLORIDE 0.4 MILLIGRAM(S): 0.4 CAPSULE ORAL at 21:18

## 2022-07-05 RX ADMIN — LISINOPRIL 10 MILLIGRAM(S): 2.5 TABLET ORAL at 06:05

## 2022-07-05 RX ADMIN — Medication 2: at 16:50

## 2022-07-05 RX ADMIN — Medication 100 MILLIGRAM(S): at 06:04

## 2022-07-05 RX ADMIN — Medication 5 UNIT(S): at 11:36

## 2022-07-05 RX ADMIN — Medication 100 MILLIGRAM(S): at 18:38

## 2022-07-05 RX ADMIN — Medication 5 UNIT(S): at 08:08

## 2022-07-05 RX ADMIN — ATORVASTATIN CALCIUM 10 MILLIGRAM(S): 80 TABLET, FILM COATED ORAL at 21:18

## 2022-07-05 RX ADMIN — Medication 100 MILLIGRAM(S): at 21:18

## 2022-07-05 RX ADMIN — HEPARIN SODIUM 5000 UNIT(S): 5000 INJECTION INTRAVENOUS; SUBCUTANEOUS at 06:04

## 2022-07-05 RX ADMIN — Medication 1: at 11:37

## 2022-07-05 RX ADMIN — INSULIN GLARGINE 20 UNIT(S): 100 INJECTION, SOLUTION SUBCUTANEOUS at 21:28

## 2022-07-05 RX ADMIN — HEPARIN SODIUM 5000 UNIT(S): 5000 INJECTION INTRAVENOUS; SUBCUTANEOUS at 21:18

## 2022-07-05 RX ADMIN — HEPARIN SODIUM 5000 UNIT(S): 5000 INJECTION INTRAVENOUS; SUBCUTANEOUS at 13:21

## 2022-07-05 RX ADMIN — Medication 650 MILLIGRAM(S): at 13:20

## 2022-07-05 RX ADMIN — Medication 1 TABLET(S): at 06:06

## 2022-07-05 RX ADMIN — Medication 1: at 08:08

## 2022-07-05 RX ADMIN — Medication 100 MILLIGRAM(S): at 13:00

## 2022-07-05 RX ADMIN — Medication 650 MILLIGRAM(S): at 11:03

## 2022-07-05 RX ADMIN — FINASTERIDE 5 MILLIGRAM(S): 5 TABLET, FILM COATED ORAL at 11:38

## 2022-07-05 NOTE — PROGRESS NOTE ADULT - SUBJECTIVE AND OBJECTIVE BOX
Patient is a 76y old  Male who presents with a chief complaint of Cellulitis       HPI:  77 yo male   PMH :  pre-diabetes , HT   Presenting for R foot pain that has been constant for the past 3 days.   States he fell on the sidewalk and injured his R knee and foot 3 days ago. It was a mechanical fall with no LOC, dizziness   Went to an ED at [facility] in [facility], had xrays done at the time, states they were negative and was given oral antibiotics  States that the swelling and discoloration to the foot have worsened since then.   Denies : fevers, previous infections, hx of known vascular disease    In the ED  Bp : 170/85  HR : 75   RR: 18  T : 97.9  O2 : 98% on RA    Gluc : 341 , WBC 5    CT Angio LE R :   Intact arterial flow/triple-vessel runoff to the right lower extremity  without evidence for significant stenosis or acute injury.  Diffuse subcutaneous soft tissue edema and areas of skin thickening  extending from the level of the distal femur through the foot - possible cellulitis   Focal 5.0 x 1.57 m hyperdense attenuation over the dorsum mid to hindfoot - possible hematoma   No definite acute osseous abnormality is identified.    Started on Ancef 1g q8 in ED    Patient pharmacy : [facility] [facility] : closed on the weekend , reopens Monday 9am-6pm ([phone])  Please call to confirm med recc : Per patient he takes 1bp med and 1 water pill, can not remember the names     #RLE cellulitis, now with bullae and significant soft tissue swelling   -SIRS not present on admission  -continue ancef 1g iv q8h per ID- eval appreciated  -tylenol prn for pain  -distal pulses present  -burn consult appreciated- Wound care - Xeroform/Kerlex/ACE Wrap BID, Elevation               PHYSICAL EXAM    Vital Signs Last 24 Hrs  T(C): 37.2 (05 Jul 2022 05:32), Max: 37.2 (05 Jul 2022 05:32)  T(F): 98.9 (05 Jul 2022 05:32), Max: 98.9 (05 Jul 2022 05:32)  HR: 77 (05 Jul 2022 05:32) (77 - 85)  BP: 114/78 (05 Jul 2022 05:32) (98/67 - 114/78)  BP(mean): --  RR: 18 (05 Jul 2022 05:32) (18 - 18)  SpO2: --    Constitutional - NAD  Chest - CTA  Cardiovascular - S1S2+  Abdomen -  Soft  Extremities -  No calf tenderness   Function : bed mobility supervision                  unable to ambulate    acetaminophen     Tablet .. 650 milliGRAM(s) Oral every 6 hours PRN  acetaminophen     Tablet .. 650 milliGRAM(s) Oral every 6 hours PRN  aluminum hydroxide/magnesium hydroxide/simethicone Suspension 30 milliLiter(s) Oral every 4 hours PRN  atorvastatin 10 milliGRAM(s) Oral at bedtime  ceFAZolin   IVPB 1000 milliGRAM(s) IV Intermittent every 8 hours  dextrose 5%. 1000 milliLiter(s) IV Continuous <Continuous>  dextrose 5%. 1000 milliLiter(s) IV Continuous <Continuous>  dextrose 50% Injectable 25 Gram(s) IV Push once  dextrose 50% Injectable 12.5 Gram(s) IV Push once  dextrose 50% Injectable 25 Gram(s) IV Push once  dextrose Oral Gel 15 Gram(s) Oral once PRN  finasteride 5 milliGRAM(s) Oral daily  glucagon  Injectable 1 milliGRAM(s) IntraMuscular once  heparin   Injectable 5000 Unit(s) SubCutaneous every 8 hours  insulin glargine Injectable (LANTUS) 15 Unit(s) SubCutaneous at bedtime  insulin lispro (ADMELOG) corrective regimen sliding scale   SubCutaneous three times a day before meals  insulin lispro Injectable (ADMELOG) 5 Unit(s) SubCutaneous three times a day before meals  lisinopril 10 milliGRAM(s) Oral daily  melatonin 3 milliGRAM(s) Oral at bedtime PRN  ondansetron Injectable 4 milliGRAM(s) IV Push every 8 hours PRN  tamsulosin 0.4 milliGRAM(s) Oral at bedtime  triamterene 37.5 mG/hydrochlorothiazide 25 mG Tablet 1 Tablet(s) Oral daily      RECENT LABS/IMAGING                        14.3   6.43  )-----------( 218      ( 05 Jul 2022 08:37 )             40.0     07-05    138  |  99  |  17  ----------------------------<  160<H>  4.3   |  25  |  0.7    Ca    9.1      05 Jul 2022 08:37  Phos  3.2     07-05  Mg     2.2     07-05    TPro  7.4  /  Alb  4.7  /  TBili  0.8  /  DBili  x   /  AST  20  /  ALT  22  /  AlkPhos  121<H>  07-04

## 2022-07-05 NOTE — PROGRESS NOTE ADULT - ASSESSMENT
76 Y M with pmh of pre-DM, HTN, BPH presents to ED after hurting his right foot on Monday. He says he was walking and tripped, rolled his right ankle/foot, and since then has had significant pain and swelling that is worsening. He went to [facility] in [place], had xrays done (which he says were negative), and given oral antibiotics. However, has had worsening pain and swelling, now with worsening bruising and inability to ambulate. Denies fevers, previous infections, loss of sensation.     # RLE cellulitis, presented with bullae and significant soft tissue swelling   - SIRS not present on admission  - continue ancef 1g iv q8h per ID- eval appreciated  - tylenol prn for pain  - distal pulses present  - burn consult appreciated- Wound care - Xeroform/Kerlex/ACE Wrap BID, Elevation  - va duplex RLE venous negative  - cleared by Burn, continue with recommenced with wound care  - follow up ID regarding antibiotic regimen for discharge    # DM - uncontrolled, A1c elevated  - at home on metformin 1000 qD   - insulin naive    - will increase insulin scale for better control, glargine 20 units qhs , lispro 7 units with meals + SS (low)    # HTN  - c/w home meds, lisinopril and HCTZ/triamterene     # HLD  - c/w home atorvastatin 10 at bed time     # BPH   - c/w home flomax and finasteride    DVT prophylaxis: heparin subcut q8h  GI PPx: None   Diet: DASH/TLC/CC  Full Code:  Dispo: Pt lives with his brother and brother's wife. Pt has a wife but no mentioned of his wife on conversation. Pt's brother and his family likely unable to take care his nursing care. May need SNF for management of wound care and administration of antibiotics.

## 2022-07-05 NOTE — PROGRESS NOTE ADULT - TIME BILLING
Total time spent to complete patient's bedside assessment, physical examination, review medical chart including labs & imaging, discuss medical plan of care with housestaff was more than 25 minutes
I have personally seen and examined this patient.    I have reviewed all pertinent clinical information and reviewed all relevant imaging and diagnostic studies personally.   I counseled the patient about diagnostic testing and treatment plan. All questions were answered.   I discussed recommendations with the primary team.

## 2022-07-05 NOTE — PROGRESS NOTE ADULT - ASSESSMENT
ASSESSMENT  77 yo male with PMH of pre-diabetes, HTN who presents with fight foot pain.     IMPRESSION  #Right LE cellulitis with hematoma - SIRS not present on admission   - CT Angio Lower Extremity w/ IV Cont, Right (07.02.22 @ 01:50): Diffuse subcutaneous soft tissue edema and areas of skin thickening  extending from the level of the distal femur through the foot with more   focal 5.0 x 1.57 m hyperdense attenuation over the dorsum mid to hindfoot  which may reflect underlying hematoma. Clinical correlation would be  needed to exclude any signs of cellulitis or infection. No definite acute osseous abnormality is identified.  - WBC Count: 5.24 K/uL (07.02.22 @ 00:26)    #Abx allergy: NKDA    RECOMMENDATIONS  - appreciate burn evaluation   - continue cefazolin 1g q 8 hours   - add doxycycline 100 mg BID   - Keep RLE elevated   - will plan PO Keflex 500 mg QID and Doxycycline 100 mg BID for 10 days     Please call or message on [employer] Teams if with any questions.  Spectra [phone]

## 2022-07-05 NOTE — PROGRESS NOTE ADULT - SUBJECTIVE AND OBJECTIVE BOX
LALMARIANAEN  76y Male    Patient is a 76y old  Male who presents with a chief complaint of right foot pain after a trip and fall    HPI:  75 yo male   PMH :  pre-diabetes , HT   Presenting for R foot pain that has been constant for the past 3 days.   States he fell on the sidewalk and injured his R knee and foot 3 days ago. It was a mechanical fall with no LOC, dizziness   Went to an ED at [facility] in [facility], had xrays done at the time, states they were negative and was given oral antibiotics  States that the swelling and discoloration to the foot have worsened since then.   Denies : fevers, previous infections, hx of known vascular disease    In the ED  Bp : 170/85  HR : 75   RR: 18  T : 97.9  O2 : 98% on RA    Gluc : 341 , WBC 5    CT Angio LE R :   Intact arterial flow/triple-vessel runoff to the right lower extremity  without evidence for significant stenosis or acute injury.  Diffuse subcutaneous soft tissue edema and areas of skin thickening  extending from the level of the distal femur through the foot - possible cellulitis   Focal 5.0 x 1.57 m hyperdense attenuation over the dorsum mid to hindfoot - possible hematoma   No definite acute osseous abnormality is identified.    Started on Ancef 1g q8 in ED    Patient pharmacy : [facility] [address] [facility] : closed on the weekend , reopens Monday 9am-6pm ([phone])  Please call to confirm med recc : Per patient he takes 1bp med and 1 water pill, can not remember the names          (02 Jul 2022 10:13)      INTERVAL HPI/OVERNIGHT EVENTS:    ROS: Pt denies fever, chills, SOB, palpitations, nausea, vomiting, abdominal pain, dysuria, diarrhea, constipation, rash, muscle or joint pain.    Overnight events: No acute events overnight.    Vital Signs Last 24 Hrs  T(F): 98 (07-05-22 @ 14:49), Max: 98.9 (07-05-22 @ 05:32)  HR: 79 (07-05-22 @ 14:49) (77 - 79)  BP: 120/69 (07-05-22 @ 14:49) (114/78 - 120/69)  RR: 20 (07-05-22 @ 14:49) (18 - 20)  SpO2: --    No Known Allergies      MEDICATIONS  (STANDING):  atorvastatin 10 milliGRAM(s) Oral at bedtime  ceFAZolin   IVPB 1000 milliGRAM(s) IV Intermittent every 8 hours  dextrose 5%. 1000 milliLiter(s) (50 mL/Hr) IV Continuous <Continuous>  dextrose 5%. 1000 milliLiter(s) (100 mL/Hr) IV Continuous <Continuous>  dextrose 50% Injectable 25 Gram(s) IV Push once  dextrose 50% Injectable 12.5 Gram(s) IV Push once  dextrose 50% Injectable 25 Gram(s) IV Push once  finasteride 5 milliGRAM(s) Oral daily  glucagon  Injectable 1 milliGRAM(s) IntraMuscular once  heparin   Injectable 5000 Unit(s) SubCutaneous every 8 hours  insulin glargine Injectable (LANTUS) 15 Unit(s) SubCutaneous at bedtime  insulin lispro (ADMELOG) corrective regimen sliding scale   SubCutaneous three times a day before meals  insulin lispro Injectable (ADMELOG) 5 Unit(s) SubCutaneous three times a day before meals  lisinopril 10 milliGRAM(s) Oral daily  tamsulosin 0.4 milliGRAM(s) Oral at bedtime  triamterene 37.5 mG/hydrochlorothiazide 25 mG Tablet 1 Tablet(s) Oral daily    MEDICATIONS  (PRN):  acetaminophen     Tablet .. 650 milliGRAM(s) Oral every 6 hours PRN Temp greater or equal to 38C (100.4F), Mild Pain (1 - 3)  acetaminophen     Tablet .. 650 milliGRAM(s) Oral every 6 hours PRN Temp greater or equal to 38C (100.4F), Mild Pain (1 - 3), Moderate Pain (4 - 6)  aluminum hydroxide/magnesium hydroxide/simethicone Suspension 30 milliLiter(s) Oral every 4 hours PRN Dyspepsia  dextrose Oral Gel 15 Gram(s) Oral once PRN Blood Glucose LESS THAN 70 milliGRAM(s)/deciliter  melatonin 3 milliGRAM(s) Oral at bedtime PRN Insomnia  ondansetron Injectable 4 milliGRAM(s) IV Push every 8 hours PRN Nausea and/or Vomiting      PHYSICAL EXAM:  General Appearance: NAD, normal for age and gender.   EMNT: Moist oral mucosa.   Neck: Normal JVP, no bruit.   Eyes: Conjunctiva clear, Extra Ocular muscles intact.   Cardiovascular: Regular rate and rhythm S1 S2, No JVD, No murmurs.  Respiratory: Lungs clear to auscultation. No wheezes, rales or rhonchi.  Psychiatry: Alert and oriented x 3, Mood & affect appropriate. Hard of hearing but able to lip read.	  Gastrointestinal:  Soft, Non-tender, Non-distended.  Neurologic: Non-focal deficits.  Musculoskeletal/ extremities: Normal range of motion, No clubbing, cyanosis. Swollen right foot with wound and dressing.  Vascular: Peripheral pulses palpable bilaterally.  Skin/Integumen: No rashes, No ecchymoses, No cyanosis.      LABS:                        14.3   6.43  )-----------( 218      ( 05 Jul 2022 08:37 )             40.0     07-05    138  |  99  |  17  ----------------------------<  160<H>  4.3   |  25  |  0.7    Ca    9.1      05 Jul 2022 08:37  Phos  3.2     07-05  Mg     2.2     07-05    TPro  7.4  /  Alb  4.7  /  TBili  0.8  /  DBili  x   /  AST  20  /  ALT  22  /  AlkPhos  121<H>  07-04      Culture - Blood (collected 02 Jul 2022 18:28)  Source: .Blood None  Preliminary Report (04 Jul 2022 05:01):    No growth to date.      RADIOLOGY & ADDITIONAL TESTS:  < from: [facility] Duplex Lower Ext Vein Scan, Right (07.04.22 @ 09:27) >  No evidence of deep venous thrombosis or superficialthrombophlebitis in   the right lower extremity.

## 2022-07-05 NOTE — PROGRESS NOTE ADULT - ASSESSMENT
Imp : rehab of gait dysfunction / RLE cellulitis  Plan : continue bedside therapy as tolerated            consider d/c home vs STR when medically stable

## 2022-07-05 NOTE — PROGRESS NOTE ADULT - ASSESSMENT
76y old  Male presenting with RLE swelling after a fall. Seen by Burn for cellulitis follow up.    -No surgical intervention at this time.  - Local wound care: please wash wound with soap and water. Apply Xeroform, Kerlix, and Ace wrap.    Discussed plan with pt  Remainder of care per primary team    Recall PRN

## 2022-07-05 NOTE — PROGRESS NOTE ADULT - SUBJECTIVE AND OBJECTIVE BOX
Patient is a 76y old  Male who presents with a chief complaint of Cellulitis (05 Jul 2022 11:18)    Seen by burn team in follow up for R foot cellulitis.      LABS:                          14.3   6.43  )-----------( 218      ( 05 Jul 2022 08:37 )             40.0     07-05    138  |  99  |  17  ----------------------------<  160<H>  4.3   |  25  |  0.7    Ca    9.1      05 Jul 2022 08:37  Phos  3.2     07-05  Mg     2.2     07-05    TPro  7.4  /  Alb  4.7  /  TBili  0.8  /  DBili  x   /  AST  20  /  ALT  22  /  AlkPhos  121<H>  07-04    LIVER FUNCTIONS - ( 04 Jul 2022 08:16 )  Alb: 4.7 g/dL / Pro: 7.4 g/dL / ALK PHOS: 121 U/L / ALT: 22 U/L / AST: 20 U/L / GGT: x           CULTURE RESULTS:                07-02-22 @ 18:28  Specimen Source: --  Method Type: --  Gram Stain - RRL: --  Gram Stain - Wound: --  Bacteria: --  Culture Results:   No growth to date.      T(C): 36.7 (05 Jul 2022 14:49), Max: 37.2 (05 Jul 2022 05:32)  T(F): 98 (05 Jul 2022 14:49), Max: 98.9 (05 Jul 2022 05:32)  HR: 79 (05 Jul 2022 14:49) (77 - 79)  BP: 120/69 (05 Jul 2022 14:49) (114/78 - 120/69)  RR: 20 (05 Jul 2022 14:49) (18 - 20)    PHYSICAL EXAM:    SKIN: Rt Foot swelling and echymosis  areas of ruptured blisters on dorsum just proximal to toes and lateral mid foot. No exudates. No active bleeding. Serosanguinous discharge at lateral area.  PT/DP palpable  no tingling numbness of foot good cap refill

## 2022-07-05 NOTE — PROGRESS NOTE ADULT - SUBJECTIVE AND OBJECTIVE BOX
HALISHAILA  76y, Male  Allergy: No Known Allergies      LOS  3d    CHIEF COMPLAINT: Cellulitis (05 Jul 2022 17:09)      INTERVAL EVENTS/HPI  - No acute events overnight  - T(F): , Max: 98.9 (07-05-22 @ 05:32)  - Denies any worsening symptoms  - Tolerating medication  - WBC Count: 6.43 (07-05-22 @ 08:37)  WBC Count: 7.58 (07-04-22 @ 08:16)     - Creatinine, Serum: 0.7 (07-05-22 @ 08:37)  Creatinine, Serum: 0.6 (07-04-22 @ 08:16)       ROS  General: Denies rigors, nightsweats  HEENT: Denies headache, rhinorrhea, sore throat, eye pain  CV: Denies CP, palpitations  PULM: Denies wheezing, hemoptysis  GI: Denies hematemesis, hematochezia, melena  : Denies discharge, hematuria  MSK: Denies arthralgias, myalgias  SKIN: Denies rash, lesions  NEURO: Denies paresthesias, weakness  PSYCH: Denies depression, anxiety    VITALS:  T(F): 98, Max: 98.9 (07-05-22 @ 05:32)  HR: 79  BP: 120/69  RR: 20Vital Signs Last 24 Hrs  T(C): 36.7 (05 Jul 2022 14:49), Max: 37.2 (05 Jul 2022 05:32)  T(F): 98 (05 Jul 2022 14:49), Max: 98.9 (05 Jul 2022 05:32)  HR: 79 (05 Jul 2022 14:49) (77 - 79)  BP: 120/69 (05 Jul 2022 14:49) (114/78 - 120/69)  BP(mean): --  RR: 20 (05 Jul 2022 14:49) (18 - 20)  SpO2: --    PHYSICAL EXAM:  Gen: NAD, resting in bed  HEENT: Normocephalic, atraumatic  Neck: supple, no lymphadenopathy  CV: Regular rate & regular rhythm  Lungs: decreased BS at bases, no fremitus  Abdomen: Soft, BS present  Ext: Warm, well perfused  Neuro: non focal, awake  Skin: no rash, no erythema  Lines: no phlebitis    FH: Non-contributory  Social Hx: Non-contributory    TESTS & MEASUREMENTS:                        14.3   6.43  )-----------( 218      ( 05 Jul 2022 08:37 )             40.0     07-05    138  |  99  |  17  ----------------------------<  160<H>  4.3   |  25  |  0.7    Ca    9.1      05 Jul 2022 08:37  Phos  3.2     07-05  Mg     2.2     07-05    TPro  7.4  /  Alb  4.7  /  TBili  0.8  /  DBili  x   /  AST  20  /  ALT  22  /  AlkPhos  121<H>  07-04      LIVER FUNCTIONS - ( 04 Jul 2022 08:16 )  Alb: 4.7 g/dL / Pro: 7.4 g/dL / ALK PHOS: 121 U/L / ALT: 22 U/L / AST: 20 U/L / GGT: x               Culture - Blood (collected 07-02-22 @ 18:28)  Source: .Blood None  Preliminary Report (07-04-22 @ 05:01):    No growth to date.    Culture - Blood (collected 07-02-22 @ 03:11)  Source: .Blood Blood-Peripheral  Preliminary Report (07-03-22 @ 12:01):    No growth to date.    Culture - Blood (collected 07-02-22 @ 03:11)  Source: .Blood Blood-Peripheral  Preliminary Report (07-03-22 @ 12:01):    No growth to date.            INFECTIOUS DISEASES TESTING  COVID-19 PCR: NotDetec (07-02-22 @ 02:49)      INFLAMMATORY MARKERS  Sedimentation Rate, Erythrocyte: 20 mm/Hr (07-02-22 @ 12:04)  C-Reactive Protein, Serum: 16.0 mg/L (07-02-22 @ 12:04)      RADIOLOGY & ADDITIONAL TESTS:  I have personally reviewed the last available Chest xray  CXR      CT      CARDIOLOGY TESTING      MEDICATIONS  atorvastatin 10 Oral at bedtime  BACItracin   Ointment 1 Topical two times a day  ceFAZolin   IVPB 1000 IV Intermittent every 8 hours  finasteride 5 Oral daily  heparin   Injectable 5000 SubCutaneous every 8 hours  insulin glargine Injectable (LANTUS) 20 SubCutaneous at bedtime  insulin lispro (ADMELOG) corrective regimen sliding scale  SubCutaneous three times a day before meals  insulin lispro Injectable (ADMELOG) 7 SubCutaneous three times a day before meals  lisinopril 10 Oral daily  tamsulosin 0.4 Oral at bedtime  triamterene 37.5 mG/hydrochlorothiazide 25 mG Tablet 1 Oral daily      WEIGHT  Weight (kg): 72.6 (07-01-22 @ 22:14)  Creatinine, Serum: 0.7 mg/dL (07-05-22 @ 08:37)      ANTIBIOTICS:  ceFAZolin   IVPB 1000 milliGRAM(s) IV Intermittent every 8 hours      All available historical records have been reviewed

## 2022-07-05 NOTE — PROGRESS NOTE ADULT - ATTENDING COMMENTS
As above   Right leg and foot - decreasing edema and resolving ecchymosis     Mechanical debridement of devitalized skin and dorsal foot wound performed . Pt barbra well  Continue wound care as above   Concerns addressed

## 2022-07-06 LAB
ANION GAP SERPL CALC-SCNC: 10 MMOL/L — SIGNIFICANT CHANGE UP (ref 7–14)
BUN SERPL-MCNC: 15 MG/DL — SIGNIFICANT CHANGE UP (ref 10–20)
CALCIUM SERPL-MCNC: 8.8 MG/DL — SIGNIFICANT CHANGE UP (ref 8.5–10.1)
CHLORIDE SERPL-SCNC: 98 MMOL/L — SIGNIFICANT CHANGE UP (ref 98–110)
CO2 SERPL-SCNC: 29 MMOL/L — SIGNIFICANT CHANGE UP (ref 17–32)
CREAT SERPL-MCNC: 0.7 MG/DL — SIGNIFICANT CHANGE UP (ref 0.7–1.5)
EGFR: 95 ML/MIN/1.73M2 — SIGNIFICANT CHANGE UP
GLUCOSE BLDC GLUCOMTR-MCNC: 131 MG/DL — HIGH (ref 70–99)
GLUCOSE BLDC GLUCOMTR-MCNC: 137 MG/DL — HIGH (ref 70–99)
GLUCOSE BLDC GLUCOMTR-MCNC: 142 MG/DL — HIGH (ref 70–99)
GLUCOSE BLDC GLUCOMTR-MCNC: 148 MG/DL — HIGH (ref 70–99)
GLUCOSE SERPL-MCNC: 152 MG/DL — HIGH (ref 70–99)
HCT VFR BLD CALC: 39.4 % — LOW (ref 42–52)
HGB BLD-MCNC: 13.3 G/DL — LOW (ref 14–18)
MAGNESIUM SERPL-MCNC: 2.1 MG/DL — SIGNIFICANT CHANGE UP (ref 1.8–2.4)
MCHC RBC-ENTMCNC: 33.5 PG — HIGH (ref 27–31)
MCHC RBC-ENTMCNC: 33.8 G/DL — SIGNIFICANT CHANGE UP (ref 32–37)
MCV RBC AUTO: 99.2 FL — HIGH (ref 80–94)
NRBC # BLD: 0 /100 WBCS — SIGNIFICANT CHANGE UP (ref 0–0)
PLATELET # BLD AUTO: 237 K/UL — SIGNIFICANT CHANGE UP (ref 130–400)
POTASSIUM SERPL-MCNC: 4.1 MMOL/L — SIGNIFICANT CHANGE UP (ref 3.5–5)
POTASSIUM SERPL-SCNC: 4.1 MMOL/L — SIGNIFICANT CHANGE UP (ref 3.5–5)
RBC # BLD: 3.97 M/UL — LOW (ref 4.7–6.1)
RBC # FLD: 12.5 % — SIGNIFICANT CHANGE UP (ref 11.5–14.5)
SODIUM SERPL-SCNC: 137 MMOL/L — SIGNIFICANT CHANGE UP (ref 135–146)
WBC # BLD: 5.79 K/UL — SIGNIFICANT CHANGE UP (ref 4.8–10.8)
WBC # FLD AUTO: 5.79 K/UL — SIGNIFICANT CHANGE UP (ref 4.8–10.8)

## 2022-07-06 PROCEDURE — 99233 SBSQ HOSP IP/OBS HIGH 50: CPT

## 2022-07-06 RX ADMIN — Medication 7 UNIT(S): at 12:27

## 2022-07-06 RX ADMIN — Medication 100 MILLIGRAM(S): at 05:34

## 2022-07-06 RX ADMIN — Medication 1 APPLICATION(S): at 05:34

## 2022-07-06 RX ADMIN — Medication 100 MILLIGRAM(S): at 22:50

## 2022-07-06 RX ADMIN — LISINOPRIL 10 MILLIGRAM(S): 2.5 TABLET ORAL at 05:34

## 2022-07-06 RX ADMIN — Medication 100 MILLIGRAM(S): at 17:17

## 2022-07-06 RX ADMIN — HEPARIN SODIUM 5000 UNIT(S): 5000 INJECTION INTRAVENOUS; SUBCUTANEOUS at 17:17

## 2022-07-06 RX ADMIN — Medication 1 TABLET(S): at 05:35

## 2022-07-06 RX ADMIN — Medication 100 MILLIGRAM(S): at 05:42

## 2022-07-06 RX ADMIN — Medication 100 MILLIGRAM(S): at 17:45

## 2022-07-06 RX ADMIN — Medication 7 UNIT(S): at 08:12

## 2022-07-06 RX ADMIN — FINASTERIDE 5 MILLIGRAM(S): 5 TABLET, FILM COATED ORAL at 12:30

## 2022-07-06 RX ADMIN — HEPARIN SODIUM 5000 UNIT(S): 5000 INJECTION INTRAVENOUS; SUBCUTANEOUS at 05:35

## 2022-07-06 RX ADMIN — TAMSULOSIN HYDROCHLORIDE 0.4 MILLIGRAM(S): 0.4 CAPSULE ORAL at 22:49

## 2022-07-06 RX ADMIN — HEPARIN SODIUM 5000 UNIT(S): 5000 INJECTION INTRAVENOUS; SUBCUTANEOUS at 22:50

## 2022-07-06 RX ADMIN — Medication 1 APPLICATION(S): at 17:18

## 2022-07-06 RX ADMIN — Medication 7 UNIT(S): at 17:16

## 2022-07-06 RX ADMIN — ATORVASTATIN CALCIUM 10 MILLIGRAM(S): 80 TABLET, FILM COATED ORAL at 22:50

## 2022-07-06 RX ADMIN — INSULIN GLARGINE 20 UNIT(S): 100 INJECTION, SOLUTION SUBCUTANEOUS at 22:49

## 2022-07-06 NOTE — PROGRESS NOTE ADULT - SUBJECTIVE AND OBJECTIVE BOX
SHAIL[facility] LAL  [facility] ([facility])            Patient was evaluated and examined  by bedside,                 REVIEW OF SYSTEMS:  please see pertinent positives mentioned above, all other 12 ROS negative      T(C): , Max: 37.6 (07-05-22 @ 20:12)  HR: 76 (07-06-22 @ 14:49)  BP: 123/79 (07-06-22 @ 14:49)  RR: 18 (07-06-22 @ 14:49)  SpO2: --  CAPILLARY BLOOD GLUCOSE      POCT Blood Glucose.: 137 mg/dL (06 Jul 2022 16:43)  POCT Blood Glucose.: 148 mg/dL (06 Jul 2022 11:20)  POCT Blood Glucose.: 142 mg/dL (06 Jul 2022 07:12)  POCT Blood Glucose.: 178 mg/dL (05 Jul 2022 22:02)  POCT Blood Glucose.: 170 mg/dL (05 Jul 2022 21:20)      PHYSICAL EXAM:  General: NAD, AAOX3, patient is laying comfortably in bed  HEENT: AT, NC, Supple, NO JVD, NO CB  Lungs: CTA B/L, no wheezing, no rhonchi  CVS: normal S1, S2, RRR, NO M/G/R  Abdomen: soft, bowel sounds present, non-tender, non-distended  Extremities: no edema, no clubbing, no cyanosis, positive peripheral pulses b/l  Neuro: no acute focal neurological deficits  Skin: no rush, no ecchymosis      LAB  CBC  Date: 07-06-22 @ 07:36  Mean cell Bahnfjluwa42.5  Mean cell Hemoglobin Conc33.8  Mean cell Volum 99.2  Platelet count-Automate 237  RBC Count 3.97  Red Cell Distrib Width12.5  WBC Count5.79  % Albumin, Urine--  Hematocrit 39.4  Hemoglobin 13.3  CBC  Date: 07-05-22 @ 08:37  Mean cell Jfcumjsuwt60.8  Mean cell Hemoglobin Conc35.8  Mean cell Volum 97.3  Platelet count-Automate 218  RBC Count 4.11  Red Cell Distrib Width12.6  WBC Count6.43  % Albumin, Urine--  Hematocrit 40.0  Hemoglobin 14.3  CBC  Date: 07-04-22 @ 08:16  Mean cell Zjvucrkfpn65.9  Mean cell Hemoglobin Conc34.9  Mean cell Volum 97.0  Platelet count-Automate 238  RBC Count 4.34  Red Cell Distrib Width12.4  WBC Count7.58  % Albumin, Urine--  Hematocrit 42.1  Hemoglobin 14.7  CBC  Date: 07-03-22 @ 07:49  Mean cell Aeknchzite53.9  Mean cell Hemoglobin Conc35.7  Mean cell Volum 98.0  Platelet count-Automate 202  RBC Count 3.95  Red Cell Distrib Width12.5  WBC Count5.98  % Albumin, Urine--  Hematocrit 38.7  Hemoglobin 13.8  CBC  Date: 07-02-22 @ 12:04  Mean cell Neyzdfwkye73.0  Mean cell Hemoglobin Conc35.7  Mean cell Volum 98.2  Platelet count-Automate 192  RBC Count 3.91  Red Cell Distrib Width12.3  WBC Count7.26  % Albumin, Urine--  Hematocrit 38.4  Hemoglobin 13.7  CBC  Date: 07-02-22 @ 00:26  Mean cell Uhhmyfdvmk29.0  Mean cell Hemoglobin Conc35.3  Mean cell Volum 99.2  Platelet count-Automate 182  RBC Count 3.77  Red Cell Distrib Width12.2  WBC Count5.24  % Albumin, Urine--  Hematocrit 37.4  Hemoglobin 13.2    [facility]  07-06-22 @ 07:36  Blood Gas Arterial-Calcium,Ionized--  Blood Urea Nitrogen, Serum 15 mg/dL [10 - 20]  Carbon Dioxide, Serum29 mmol/L [17 - 32]  Chloride, Serum98 mmol/L [98 - 110]  Creatinie, Serum0.7 mg/dL [0.7 - 1.5]  Glucose, Axgfz450 mg/dL<H> [70 - 99]  Potassium, Serum4.1 mmol/L [3.5 - 5.0]  Sodium, Serum 137 mmol/L [135 - 146]  [facility]  07-05-22 @ 08:37  Blood Gas Arterial-Calcium,Ionized--  Blood Urea Nitrogen, Serum 17 mg/dL [10 - 20]  Carbon Dioxide, Serum25 mmol/L [17 - 32]  Chloride, Serum99 mmol/L [98 - 110]  Creatinie, Serum0.7 mg/dL [0.7 - 1.5]  Glucose, Cvegw620 mg/dL<H> [70 - 99]  Potassium, Serum4.3 mmol/L [3.5 - 5.0]  Sodium, Serum 138 mmol/L [135 - 146]  [facility]  07-04-22 @ 08:16  Blood Gas Arterial-Calcium,Ionized--  Blood Urea Nitrogen, Serum 15 mg/dL [10 - 20]  Carbon Dioxide, Serum27 mmol/L [17 - 32]  Chloride, Serum96 mmol/L<L> [98 - 110]  Creatinie, Serum0.6 mg/dL<L> [0.7 - 1.5]  Glucose, Kuwnw958 mg/dL<H> [70 - 99]  Potassium, Serum4.0 mmol/L [3.5 - 5.0]  Sodium, Serum 135 mmol/L [135 - 146]  [facility]  07-03-22 @ 07:49  Blood Gas Arterial-Calcium,Ionized--  Blood Urea Nitrogen, Serum 14 mg/dL [10 - 20]  Carbon Dioxide, Serum28 mmol/L [17 - 32]  Chloride, Serum97 mmol/L<L> [98 - 110]  Creatinie, Serum0.7 mg/dL [0.7 - 1.5]  Glucose, Isjnh032 mg/dL<H> [70 - 99]  Potassium, Serum4.2 mmol/L [3.5 - 5.0]  Sodium, Serum 136 mmol/L [135 - 146]  [facility]  07-02-22 @ 12:04  Blood Gas Arterial-Calcium,Ionized--  Blood Urea Nitrogen, Serum 14 mg/dL [10 - 20]  Carbon Dioxide, Serum29 mmol/L [17 - 32]  Chloride, Serum99 mmol/L [98 - 110]  Creatinie, Serum0.7 mg/dL [0.7 - 1.5]  Glucose, Odjga990 mg/dL<H> [70 - 99]  Potassium, Serum3.9 mmol/L [3.5 - 5.0]  Sodium, Serum 138 mmol/L [135 - 146]  [facility]  07-02-22 @ 00:26  Blood Gas Arterial-Calcium,Ionized--  Blood Urea Nitrogen, Serum 21 mg/dL<H> [10 - 20]  Carbon Dioxide, Serum25 mmol/L [17 - 32]  Chloride, Serum97 mmol/L<L> [98 - 110]  Creatinie, Serum0.8 mg/dL [0.7 - 1.5]  Glucose, Vafmj804 mg/dL<H> [70 - 99]  Potassium, Serum3.9 mmol/L [3.5 - 5.0]  Sodium, Serum 135 mmol/L [135 - 146]              Microbiology:    Culture - Blood (collected 07-02-22 @ 18:28)  Source: .Blood None  Preliminary Report (07-04-22 @ 05:01):    No growth to date.    Culture - Blood (collected 07-02-22 @ 03:11)  Source: .Blood Blood-Peripheral  Preliminary Report (07-03-22 @ 12:01):    No growth to date.    Culture - Blood (collected 07-02-22 @ 03:11)  Source: .Blood Blood-Peripheral  Preliminary Report (07-03-22 @ 12:01):    No growth to date.        RADIOLOGY & ADDITIONAL TESTS:        Medications:  acetaminophen     Tablet .. 650 milliGRAM(s) Oral every 6 hours PRN  acetaminophen     Tablet .. 650 milliGRAM(s) Oral every 6 hours PRN  aluminum hydroxide/magnesium hydroxide/simethicone Suspension 30 milliLiter(s) Oral every 4 hours PRN  atorvastatin 10 milliGRAM(s) Oral at bedtime  BACItracin   Ointment 1 Application(s) Topical two times a day  ceFAZolin   IVPB 1000 milliGRAM(s) IV Intermittent every 8 hours  doxycycline monohydrate Capsule 100 milliGRAM(s) Oral every 12 hours  finasteride 5 milliGRAM(s) Oral daily  heparin   Injectable 5000 Unit(s) SubCutaneous every 8 hours  insulin glargine Injectable (LANTUS) 20 Unit(s) SubCutaneous at bedtime  insulin lispro (ADMELOG) corrective regimen sliding scale   SubCutaneous three times a day before meals  insulin lispro Injectable (ADMELOG) 7 Unit(s) SubCutaneous three times a day before meals  lisinopril 10 milliGRAM(s) Oral daily  melatonin 3 milliGRAM(s) Oral at bedtime PRN  ondansetron Injectable 4 milliGRAM(s) IV Push every 8 hours PRN  tamsulosin 0.4 milliGRAM(s) Oral at bedtime  triamterene 37.5 mG/hydrochlorothiazide 25 mG Tablet 1 Tablet(s) Oral daily        Assessment and Plan:  Patient is a 75 y/o male with pmh of pre-DM, HTN, BPH presents to ED after hurting his right foot on Monday. Patient  went to [facility] in [place], had xrays done (which he says were negative), and given oral antibiotics. However, has had worsening pain and swelling, now with worsening bruising and inability to ambulate. Denies fevers, previous infections, loss of sensation.       #Right LE cellulitis with hematoma - SIRS was not present on admission   - CT Angio Lower Extremity w/ IV Cont, Right (07.02.22 @ 01:50): Diffuse subcutaneous soft tissue edema and areas of skin thickening  extending from the level of the distal femur through the foot with more   focal 5.0 x 1.57 m hyperdense attenuation over the dorsum mid to hindfoot  which may reflect underlying hematoma. Clinical correlation would be  needed to exclude any signs of cellulitis or infection. No definite acute osseous abnormality is identified.  - WBC Count: 5.24 K/uL (07.02.22 @ 00:26)  - appreciate burn evaluation - no surgical interventions, daily wound care .   -as per ID rec:  continue cefazolin 1g q 8 hours /- added doxycycline 100 mg BID   - Keep RLE elevated   - will plan PO Keflex 500 mg QID and Doxycycline 100 mg BID for 10 days on d/c   -pain management     # DM - uncontrolled, A1c elevated  - at home on metformin 1000 qD   - now on glargine 20 units qhs , lispro 7 units with meals + SS (low)    # HTN  - c/w home meds, lisinopril and HCTZ/triamterene     # HLD  - c/w home atorvastatin 10 at bed time     # BPH   - c/w home flomax and finasteride      DVT proph.     #Progress Note Handoff: start d/c planning , home care arrangement  Family discussion: yes, medical team Disposition: Home_ with home care tomorrow.    Total time spent to complete patient's bedside assessment, review medical chart, discuss medical plan of care with covering medical team was more than 35 minutes         [facility]AIL[facility] LAL  [facility] 015 B [facility]            Patient was evaluated and examined  by bedside, no active complains                REVIEW OF SYSTEMS:  please see pertinent positives mentioned above, all other 12 ROS negative      T(C): , Max: 37.6 (07-05-22 @ 20:12)  HR: 76 (07-06-22 @ 14:49)  BP: 123/79 (07-06-22 @ 14:49)  RR: 18 (07-06-22 @ 14:49)  SpO2: --  CAPILLARY BLOOD GLUCOSE      POCT Blood Glucose.: 137 mg/dL (06 Jul 2022 16:43)  POCT Blood Glucose.: 148 mg/dL (06 Jul 2022 11:20)  POCT Blood Glucose.: 142 mg/dL (06 Jul 2022 07:12)  POCT Blood Glucose.: 178 mg/dL (05 Jul 2022 22:02)  POCT Blood Glucose.: 170 mg/dL (05 Jul 2022 21:20)      PHYSICAL EXAM:  General: NAD, AAOX3, patient is laying comfortably in bed  HEENT: AT, NC, Supple, NO JVD, NO CB  Lungs: CTA B/L, no wheezing, no rhonchi  CVS: normal S1, S2, RRR, NO M/G/R  Abdomen: soft, bowel sounds present, non-tender, non-distended  Extremities: no edema, no clubbing, no cyanosis, positive peripheral pulses b/l  Neuro: no acute focal neurological deficits  Skin: no rush, no ecchymosis      LAB  CBC  Date: 07-06-22 @ 07:36  Mean cell Uefiksefrh07.5  Mean cell Hemoglobin Conc33.8  Mean cell Volum 99.2  Platelet count-Automate 237  RBC Count 3.97  Red Cell Distrib Width12.5  WBC Count5.79  % Albumin, Urine--  Hematocrit 39.4  Hemoglobin 13.3  CBC  Date: 07-05-22 @ 08:37  Mean cell Wyhoghzwqa69.8  Mean cell Hemoglobin Conc35.8  Mean cell Volum 97.3  Platelet count-Automate 218  RBC Count 4.11  Red Cell Distrib Width12.6  WBC Count6.43  % Albumin, Urine--  Hematocrit 40.0  Hemoglobin 14.3  CBC  Date: 07-04-22 @ 08:16  Mean cell Bxnmocbxze08.9  Mean cell Hemoglobin Conc34.9  Mean cell Volum 97.0  Platelet count-Automate 238  RBC Count 4.34  Red Cell Distrib Width12.4  WBC Count7.58  % Albumin, Urine--  Hematocrit 42.1  Hemoglobin 14.7  CBC  Date: 07-03-22 @ 07:49  Mean cell Gmebmlvavl04.9  Mean cell Hemoglobin Conc35.7  Mean cell Volum 98.0  Platelet count-Automate 202  RBC Count 3.95  Red Cell Distrib Width12.5  WBC Count5.98  % Albumin, Urine--  Hematocrit 38.7  Hemoglobin 13.8  CBC  Date: 07-02-22 @ 12:04  Mean cell Bveeumachl57.0  Mean cell Hemoglobin Conc35.7  Mean cell Volum 98.2  Platelet count-Automate 192  RBC Count 3.91  Red Cell Distrib Width12.3  WBC Count7.26  % Albumin, Urine--  Hematocrit 38.4  Hemoglobin 13.7  CBC  Date: 07-02-22 @ 00:26  Mean cell Fgmamgxcge90.0  Mean cell Hemoglobin Conc35.3  Mean cell Volum 99.2  Platelet count-Automate 182  RBC Count 3.77  Red Cell Distrib Width12.2  WBC Count5.24  % Albumin, Urine--  Hematocrit 37.4  Hemoglobin 13.2    [facility]  07-06-22 @ 07:36  Blood Gas Arterial-Calcium,Ionized--  Blood Urea Nitrogen, Serum 15 mg/dL [10 - 20]  Carbon Dioxide, Serum29 mmol/L [17 - 32]  Chloride, Serum98 mmol/L [98 - 110]  Creatinie, Serum0.7 mg/dL [0.7 - 1.5]  Glucose, Yemkh261 mg/dL<H> [70 - 99]  Potassium, Serum4.1 mmol/L [3.5 - 5.0]  Sodium, Serum 137 mmol/L [135 - 146]  [facility]  07-05-22 @ 08:37  Blood Gas Arterial-Calcium,Ionized--  Blood Urea Nitrogen, Serum 17 mg/dL [10 - 20]  Carbon Dioxide, Serum25 mmol/L [17 - 32]  Chloride, Serum99 mmol/L [98 - 110]  Creatinie, Serum0.7 mg/dL [0.7 - 1.5]  Glucose, Uoqnb123 mg/dL<H> [70 - 99]  Potassium, Serum4.3 mmol/L [3.5 - 5.0]  Sodium, Serum 138 mmol/L [135 - 146]  [facility]  07-04-22 @ 08:16  Blood Gas Arterial-Calcium,Ionized--  Blood Urea Nitrogen, Serum 15 mg/dL [10 - 20]  Carbon Dioxide, Serum27 mmol/L [17 - 32]  Chloride, Serum96 mmol/L<L> [98 - 110]  Creatinie, Serum0.6 mg/dL<L> [0.7 - 1.5]  Glucose, Kpryf450 mg/dL<H> [70 - 99]  Potassium, Serum4.0 mmol/L [3.5 - 5.0]  Sodium, Serum 135 mmol/L [135 - 146]  [facility]  07-03-22 @ 07:49  Blood Gas Arterial-Calcium,Ionized--  Blood Urea Nitrogen, Serum 14 mg/dL [10 - 20]  Carbon Dioxide, Serum28 mmol/L [17 - 32]  Chloride, Serum97 mmol/L<L> [98 - 110]  Creatinie, Serum0.7 mg/dL [0.7 - 1.5]  Glucose, Cerop105 mg/dL<H> [70 - 99]  Potassium, Serum4.2 mmol/L [3.5 - 5.0]  Sodium, Serum 136 mmol/L [135 - 146]  [facility]  07-02-22 @ 12:04  Blood Gas Arterial-Calcium,Ionized--  Blood Urea Nitrogen, Serum 14 mg/dL [10 - 20]  Carbon Dioxide, Serum29 mmol/L [17 - 32]  Chloride, Serum99 mmol/L [98 - 110]  Creatinie, Serum0.7 mg/dL [0.7 - 1.5]  Glucose, Ijwht543 mg/dL<H> [70 - 99]  Potassium, Serum3.9 mmol/L [3.5 - 5.0]  Sodium, Serum 138 mmol/L [135 - 146]  [facility]  07-02-22 @ 00:26  Blood Gas Arterial-Calcium,Ionized--  Blood Urea Nitrogen, Serum 21 mg/dL<H> [10 - 20]  Carbon Dioxide, Serum25 mmol/L [17 - 32]  Chloride, Serum97 mmol/L<L> [98 - 110]  Creatinie, Serum0.8 mg/dL [0.7 - 1.5]  Glucose, Dyugt179 mg/dL<H> [70 - 99]  Potassium, Serum3.9 mmol/L [3.5 - 5.0]  Sodium, Serum 135 mmol/L [135 - 146]              Microbiology:    Culture - Blood (collected 07-02-22 @ 18:28)  Source: .Blood None  Preliminary Report (07-04-22 @ 05:01):    No growth to date.    Culture - Blood (collected 07-02-22 @ 03:11)  Source: .Blood Blood-Peripheral  Preliminary Report (07-03-22 @ 12:01):    No growth to date.    Culture - Blood (collected 07-02-22 @ 03:11)  Source: .Blood Blood-Peripheral  Preliminary Report (07-03-22 @ 12:01):    No growth to date.        RADIOLOGY & ADDITIONAL TESTS:        Medications:  acetaminophen     Tablet .. 650 milliGRAM(s) Oral every 6 hours PRN  acetaminophen     Tablet .. 650 milliGRAM(s) Oral every 6 hours PRN  aluminum hydroxide/magnesium hydroxide/simethicone Suspension 30 milliLiter(s) Oral every 4 hours PRN  atorvastatin 10 milliGRAM(s) Oral at bedtime  BACItracin   Ointment 1 Application(s) Topical two times a day  ceFAZolin   IVPB 1000 milliGRAM(s) IV Intermittent every 8 hours  doxycycline monohydrate Capsule 100 milliGRAM(s) Oral every 12 hours  finasteride 5 milliGRAM(s) Oral daily  heparin   Injectable 5000 Unit(s) SubCutaneous every 8 hours  insulin glargine Injectable (LANTUS) 20 Unit(s) SubCutaneous at bedtime  insulin lispro (ADMELOG) corrective regimen sliding scale   SubCutaneous three times a day before meals  insulin lispro Injectable (ADMELOG) 7 Unit(s) SubCutaneous three times a day before meals  lisinopril 10 milliGRAM(s) Oral daily  melatonin 3 milliGRAM(s) Oral at bedtime PRN  ondansetron Injectable 4 milliGRAM(s) IV Push every 8 hours PRN  tamsulosin 0.4 milliGRAM(s) Oral at bedtime  triamterene 37.5 mG/hydrochlorothiazide 25 mG Tablet 1 Tablet(s) Oral daily        Assessment and Plan:  Patient is a 75 y/o male with pmh of pre-DM, HTN, BPH presents to ED after hurting his right foot on Monday. Patient  went to [facility] in [place], had xrays done (which he says were negative), and given oral antibiotics. However, has had worsening pain and swelling, now with worsening bruising and inability to ambulate. Denies fevers, previous infections, loss of sensation.       #Right LE cellulitis with hematoma - SIRS was not present on admission   - CT Angio Lower Extremity w/ IV Cont, Right (07.02.22 @ 01:50): Diffuse subcutaneous soft tissue edema and areas of skin thickening  extending from the level of the distal femur through the foot with more   focal 5.0 x 1.57 m hyperdense attenuation over the dorsum mid to hindfoot  which may reflect underlying hematoma. Clinical correlation would be  needed to exclude any signs of cellulitis or infection. No definite acute osseous abnormality is identified.  - WBC Count: 5.24 K/uL (07.02.22 @ 00:26)  - appreciate burn evaluation - no surgical interventions, daily wound care .   -as per ID rec:  continue cefazolin 1g q 8 hours /- added doxycycline 100 mg BID   - Keep RLE elevated   - will plan PO Keflex 500 mg QID and Doxycycline 100 mg BID for 10 days on d/c   -pain management     # DM - uncontrolled, A1c elevated  - at home on metformin 1000 qD   - now on glargine 20 units qhs , lispro 7 units with meals + SS (low)    # HTN  - c/w home meds, lisinopril and HCTZ/triamterene     # HLD  - c/w home atorvastatin 10 at bed time     # BPH   - c/w home flomax and finasteride      DVT proph.     #Progress Note Handoff: start d/c planning , home care arrangement  Family discussion: yes, medical team Disposition: Home_ with home care tomorrow.    Total time spent to complete patient's bedside assessment, review medical chart, discuss medical plan of care with covering medical team was more than 35 minutes

## 2022-07-07 ENCOUNTER — TRANSCRIPTION ENCOUNTER (OUTPATIENT)
Age: 77
End: 2022-07-07

## 2022-07-07 VITALS
DIASTOLIC BLOOD PRESSURE: 64 MMHG | HEART RATE: 101 BPM | SYSTOLIC BLOOD PRESSURE: 110 MMHG | TEMPERATURE: 97 F | RESPIRATION RATE: 18 BRPM

## 2022-07-07 PROBLEM — R73.03 PREDIABETES: Chronic | Status: ACTIVE | Noted: 2022-07-02

## 2022-07-07 LAB
ANION GAP SERPL CALC-SCNC: 10 MMOL/L — SIGNIFICANT CHANGE UP (ref 7–14)
BUN SERPL-MCNC: 15 MG/DL — SIGNIFICANT CHANGE UP (ref 10–20)
CALCIUM SERPL-MCNC: 9.1 MG/DL — SIGNIFICANT CHANGE UP (ref 8.5–10.1)
CHLORIDE SERPL-SCNC: 97 MMOL/L — LOW (ref 98–110)
CO2 SERPL-SCNC: 29 MMOL/L — SIGNIFICANT CHANGE UP (ref 17–32)
CREAT SERPL-MCNC: 0.7 MG/DL — SIGNIFICANT CHANGE UP (ref 0.7–1.5)
CULTURE RESULTS: SIGNIFICANT CHANGE UP
CULTURE RESULTS: SIGNIFICANT CHANGE UP
EGFR: 95 ML/MIN/1.73M2 — SIGNIFICANT CHANGE UP
GLUCOSE BLDC GLUCOMTR-MCNC: 113 MG/DL — HIGH (ref 70–99)
GLUCOSE BLDC GLUCOMTR-MCNC: 140 MG/DL — HIGH (ref 70–99)
GLUCOSE BLDC GLUCOMTR-MCNC: 154 MG/DL — HIGH (ref 70–99)
GLUCOSE SERPL-MCNC: 116 MG/DL — HIGH (ref 70–99)
HCT VFR BLD CALC: 38.2 % — LOW (ref 42–52)
HGB BLD-MCNC: 13.5 G/DL — LOW (ref 14–18)
MAGNESIUM SERPL-MCNC: 2.1 MG/DL — SIGNIFICANT CHANGE UP (ref 1.8–2.4)
MCHC RBC-ENTMCNC: 34.9 PG — HIGH (ref 27–31)
MCHC RBC-ENTMCNC: 35.3 G/DL — SIGNIFICANT CHANGE UP (ref 32–37)
MCV RBC AUTO: 98.7 FL — HIGH (ref 80–94)
NRBC # BLD: 0 /100 WBCS — SIGNIFICANT CHANGE UP (ref 0–0)
PLATELET # BLD AUTO: 253 K/UL — SIGNIFICANT CHANGE UP (ref 130–400)
POTASSIUM SERPL-MCNC: 4.1 MMOL/L — SIGNIFICANT CHANGE UP (ref 3.5–5)
POTASSIUM SERPL-SCNC: 4.1 MMOL/L — SIGNIFICANT CHANGE UP (ref 3.5–5)
RBC # BLD: 3.87 M/UL — LOW (ref 4.7–6.1)
RBC # FLD: 12.5 % — SIGNIFICANT CHANGE UP (ref 11.5–14.5)
SODIUM SERPL-SCNC: 136 MMOL/L — SIGNIFICANT CHANGE UP (ref 135–146)
SPECIMEN SOURCE: SIGNIFICANT CHANGE UP
SPECIMEN SOURCE: SIGNIFICANT CHANGE UP
WBC # BLD: 5.42 K/UL — SIGNIFICANT CHANGE UP (ref 4.8–10.8)
WBC # FLD AUTO: 5.42 K/UL — SIGNIFICANT CHANGE UP (ref 4.8–10.8)

## 2022-07-07 PROCEDURE — 99239 HOSP IP/OBS DSCHRG MGMT >30: CPT

## 2022-07-07 RX ORDER — METFORMIN HYDROCHLORIDE 850 MG/1
1 TABLET ORAL
Qty: 0 | Refills: 0 | DISCHARGE

## 2022-07-07 RX ORDER — SITAGLIPTIN 50 MG/1
1 TABLET, FILM COATED ORAL
Qty: 30 | Refills: 0
Start: 2022-07-07 | End: 2022-08-05

## 2022-07-07 RX ORDER — CEPHALEXIN 500 MG
1 CAPSULE ORAL
Qty: 10 | Refills: 0
Start: 2022-07-07 | End: 2022-07-16

## 2022-07-07 RX ORDER — METFORMIN HYDROCHLORIDE 850 MG/1
1 TABLET ORAL
Qty: 60 | Refills: 0
Start: 2022-07-07 | End: 2022-08-05

## 2022-07-07 RX ORDER — IBUPROFEN 200 MG
1 TABLET ORAL
Qty: 120 | Refills: 0
Start: 2022-07-07 | End: 2022-08-05

## 2022-07-07 RX ADMIN — HEPARIN SODIUM 5000 UNIT(S): 5000 INJECTION INTRAVENOUS; SUBCUTANEOUS at 14:31

## 2022-07-07 RX ADMIN — Medication 100 MILLIGRAM(S): at 14:27

## 2022-07-07 RX ADMIN — HEPARIN SODIUM 5000 UNIT(S): 5000 INJECTION INTRAVENOUS; SUBCUTANEOUS at 06:51

## 2022-07-07 RX ADMIN — Medication 100 MILLIGRAM(S): at 06:51

## 2022-07-07 RX ADMIN — Medication 7 UNIT(S): at 14:51

## 2022-07-07 RX ADMIN — FINASTERIDE 5 MILLIGRAM(S): 5 TABLET, FILM COATED ORAL at 14:50

## 2022-07-07 RX ADMIN — Medication 7 UNIT(S): at 08:06

## 2022-07-07 RX ADMIN — Medication 1 TABLET(S): at 06:52

## 2022-07-07 RX ADMIN — Medication 1 APPLICATION(S): at 06:51

## 2022-07-07 RX ADMIN — LISINOPRIL 10 MILLIGRAM(S): 2.5 TABLET ORAL at 06:52

## 2022-07-07 NOTE — DISCHARGE NOTE PROVIDER - PROVIDER TOKENS
PROVIDER:[TOKEN:[[id number]]] FREE:[LAST:[f/up with PCP as written on d/c document],PHONE:[(   )    -],FAX:[(   )    -]]

## 2022-07-07 NOTE — DISCHARGE NOTE PROVIDER - NSDCCPCAREPLAN_GEN_ALL_CORE_FT
PRINCIPAL DISCHARGE DIAGNOSIS  Diagnosis: Cellulitis  Assessment and Plan of Treatment: Take the prescribed antibiotic medicine you are given as directed until it is gone. Take it even if you feel better. It treats the infection and stops it from  Not taking all the medicine can make future infections hard to treat. Keep the infected area clean. When possible, raise the infected area above the level of your heart. This helps keep swelling down. Apply clean bandages as advised. Wash your hands often to prevent spreading the infection. In the future, wash your hands before and after you touch cuts, scratches, or bandages. This will help prevent infection.   Call your doctor or returnt o the emergency room or call 911 immediately if you have any of the following: Difficulty or pain when moving the joints above or below the infected area, Discharge or pus draining from the area, rever of 100.4°F (38°C) or higher, or as directed by your healthcare provider, pain that gets worse in or around the infected site, redness that gets worse in or around the infected area, particularly if the area of redness expands to a wider area, shaking chills, swelling of the infected area, vomiting.  Your A1C was elevated indicating diabetes. It's important to avoid foods high in sugar or high glycemic index such as rice and bread.    Please follow up with your PCP within 2 weeks, you can make an appointment with the [facility] as well, their contact info is provided.         PRINCIPAL DISCHARGE DIAGNOSIS  Diagnosis: Cellulitis  Assessment and Plan of Treatment: Take the prescribed antibiotic medicine you are given as directed until it is gone. Take it even if you feel better. It treats the infection and stops it from  Not taking all the medicine can make future infections hard to treat. Keep the infected area clean. When possible, raise the infected area above the level of your heart. This helps keep swelling down. Apply clean bandages as advised. Wash your hands often to prevent spreading the infection. In the future, wash your hands before and after you touch cuts, scratches, or bandages. This will help prevent infection.   Call your doctor or returnt o the emergency room or call 911 immediately if you have any of the following: Difficulty or pain when moving the joints above or below the infected area, Discharge or pus draining from the area, rever of 100.4°F (38°C) or higher, or as directed by your healthcare provider, pain that gets worse in or around the infected site, redness that gets worse in or around the infected area, particularly if the area of redness expands to a wider area, shaking chills, swelling of the infected area, vomiting.  You will follow up with the doctors who evaluated your leg at the [facility].  Your A1C was elevated indicating uncontrolled diabetes. It's important to avoid foods high in sugar or high glycemic index such as rice and bread.  You will continue to take metformin 1000 twice a day and we will start you on a new medication Januvia 100 once a day.  You were made an appointment at [facility], please keep this appointment and call if you can not make it for whatever reason.

## 2022-07-07 NOTE — DISCHARGE NOTE PROVIDER - NSDCFUADDAPPT_GEN_ALL_CORE_FT
- you have an appointment with a doctor at the [address].   - you have an appointment with  Doctor Keen at the [address] on July 20th at 9 30 am.

## 2022-07-07 NOTE — PROGRESS NOTE ADULT - SUBJECTIVE AND OBJECTIVE BOX
SHAILA LAL  [facility] [address] ([facility])      Patient was evaluated and examined  by bedside, no active complains      REVIEW OF SYSTEMS:  please see pertinent positives mentioned above, all other 12 ROS negative      T(C): , Max: 37.2 (07-06-22 @ 14:49)  HR: 72 (07-07-22 @ 06:41)  BP: 129/73 (07-07-22 @ 06:41)  RR: 18 (07-07-22 @ 06:41)  SpO2: --  CAPILLARY BLOOD GLUCOSE      POCT Blood Glucose.: 154 mg/dL (07 Jul 2022 11:51)  POCT Blood Glucose.: 113 mg/dL (07 Jul 2022 07:51)  POCT Blood Glucose.: 131 mg/dL (06 Jul 2022 21:31)  POCT Blood Glucose.: 137 mg/dL (06 Jul 2022 16:43)      PHYSICAL EXAM:  General: NAD, AAOX3, patient is laying comfortably in bed  HEENT: AT, NC, Supple, NO JVD, NO CB  Lungs: CTA B/L, no wheezing, no rhonchi  CVS: normal S1, S2, RRR, NO M/G/R  Abdomen: soft, bowel sounds present, non-tender, non-distended  Extremities: no edema, no clubbing, no cyanosis, positive peripheral pulses b/l  Neuro: no acute focal neurological deficits  Skin: right lower ext. wound covered with dressing, no rush, no ecchymosis        LAB  CBC  Date: 07-07-22 @ 07:55  Mean cell Eeienskvjn75.9  Mean cell Hemoglobin Conc35.3  Mean cell Volum 98.7  Platelet count-Automate 253  RBC Count 3.87  Red Cell Distrib Width12.5  WBC Count5.42  % Albumin, Urine--  Hematocrit 38.2  Hemoglobin 13.5  CBC  Date: 07-06-22 @ 07:36  Mean cell Lkahnhwjzo41.5  Mean cell Hemoglobin Conc33.8  Mean cell Volum 99.2  Platelet count-Automate 237  RBC Count 3.97  Red Cell Distrib Width12.5  WBC Count5.79  % Albumin, Urine--  Hematocrit 39.4  Hemoglobin 13.3  CBC  Date: 07-05-22 @ 08:37  Mean cell Optgjtmqjj09.8  Mean cell Hemoglobin Conc35.8  Mean cell Volum 97.3  Platelet count-Automate 218  RBC Count 4.11  Red Cell Distrib Width12.6  WBC Count6.43  % Albumin, Urine--  Hematocrit 40.0  Hemoglobin 14.3  CBC  Date: 07-04-22 @ 08:16  Mean cell Wxkvshisnz01.9  Mean cell Hemoglobin Conc34.9  Mean cell Volum 97.0  Platelet count-Automate 238  RBC Count 4.34  Red Cell Distrib Width12.4  WBC Count7.58  % Albumin, Urine--  Hematocrit 42.1  Hemoglobin 14.7  CBC  Date: 07-03-22 @ 07:49  Mean cell Jongxbivbq89.9  Mean cell Hemoglobin Conc35.7  Mean cell Volum 98.0  Platelet count-Automate 202  RBC Count 3.95  Red Cell Distrib Width12.5  WBC Count5.98  % Albumin, Urine--  Hematocrit 38.7  Hemoglobin 13.8  CBC  Date: 07-02-22 @ 12:04  Mean cell Baaaehvwuj46.0  Mean cell Hemoglobin Conc35.7  Mean cell Volum 98.2  Platelet count-Automate 192  RBC Count 3.91  Red Cell Distrib Width12.3  WBC Count7.26  % Albumin, Urine--  Hematocrit 38.4  Hemoglobin 13.7  CBC  Date: 07-02-22 @ 00:26  Mean cell Cxljvooion99.0  Mean cell Hemoglobin Conc35.3  Mean cell Volum 99.2  Platelet count-Automate 182  RBC Count 3.77  Red Cell Distrib Width12.2  WBC Count5.24  % Albumin, Urine--  Hematocrit 37.4  Hemoglobin 13.2    [facility]  07-07-22 @ 07:55  Blood Gas Arterial-Calcium,Ionized--  Blood Urea Nitrogen, Serum 15 mg/dL [10 - 20]  Carbon Dioxide, Serum29 mmol/L [17 - 32]  Chloride, Serum97 mmol/L<L> [98 - 110]  Creatinie, Serum0.7 mg/dL [0.7 - 1.5]  Glucose, Rfjbt928 mg/dL<H> [70 - 99]  Potassium, Serum4.1 mmol/L [3.5 - 5.0]  Sodium, Serum 136 mmol/L [135 - 146]  [facility]  07-06-22 @ 07:36  Blood Gas Arterial-Calcium,Ionized--  Blood Urea Nitrogen, Serum 15 mg/dL [10 - 20]  Carbon Dioxide, Serum29 mmol/L [17 - 32]  Chloride, Serum98 mmol/L [98 - 110]  Creatinie, Serum0.7 mg/dL [0.7 - 1.5]  Glucose, Mydhj766 mg/dL<H> [70 - 99]  Potassium, Serum4.1 mmol/L [3.5 - 5.0]  Sodium, Serum 137 mmol/L [135 - 146]  [facility]  07-05-22 @ 08:37  Blood Gas Arterial-Calcium,Ionized--  Blood Urea Nitrogen, Serum 17 mg/dL [10 - 20]  Carbon Dioxide, Serum25 mmol/L [17 - 32]  Chloride, Serum99 mmol/L [98 - 110]  Creatinie, Serum0.7 mg/dL [0.7 - 1.5]  Glucose, Uzuom256 mg/dL<H> [70 - 99]  Potassium, Serum4.3 mmol/L [3.5 - 5.0]  Sodium, Serum 138 mmol/L [135 - 146]  [facility]  07-04-22 @ 08:16  Blood Gas Arterial-Calcium,Ionized--  Blood Urea Nitrogen, Serum 15 mg/dL [10 - 20]  Carbon Dioxide, Serum27 mmol/L [17 - 32]  Chloride, Serum96 mmol/L<L> [98 - 110]  Creatinie, Serum0.6 mg/dL<L> [0.7 - 1.5]  Glucose, Fvzeh368 mg/dL<H> [70 - 99]  Potassium, Serum4.0 mmol/L [3.5 - 5.0]  Sodium, Serum 135 mmol/L [135 - 146]  [facility]  07-03-22 @ 07:49  Blood Gas Arterial-Calcium,Ionized--  Blood Urea Nitrogen, Serum 14 mg/dL [10 - 20]  Carbon Dioxide, Serum28 mmol/L [17 - 32]  Chloride, Serum97 mmol/L<L> [98 - 110]  Creatinie, Serum0.7 mg/dL [0.7 - 1.5]  Glucose, Ftaxt339 mg/dL<H> [70 - 99]  Potassium, Serum4.2 mmol/L [3.5 - 5.0]  Sodium, Serum 136 mmol/L [135 - 146]              Microbiology:    Culture - Blood (collected 07-02-22 @ 18:28)  Source: .Blood None  Preliminary Report (07-04-22 @ 05:01):    No growth to date.    Culture - Blood (collected 07-02-22 @ 03:11)  Source: .Blood Blood-Peripheral  Final Report (07-07-22 @ 12:00):    No Growth Final    Culture - Blood (collected 07-02-22 @ 03:11)  Source: .Blood Blood-Peripheral  Final Report (07-07-22 @ 12:00):    No Growth Final        Medications:  acetaminophen     Tablet .. 650 milliGRAM(s) Oral every 6 hours PRN  acetaminophen     Tablet .. 650 milliGRAM(s) Oral every 6 hours PRN  aluminum hydroxide/magnesium hydroxide/simethicone Suspension 30 milliLiter(s) Oral every 4 hours PRN  atorvastatin 10 milliGRAM(s) Oral at bedtime  BACItracin   Ointment 1 Application(s) Topical two times a day  ceFAZolin   IVPB 1000 milliGRAM(s) IV Intermittent every 8 hours  doxycycline monohydrate Capsule 100 milliGRAM(s) Oral every 12 hours  finasteride 5 milliGRAM(s) Oral daily  heparin   Injectable 5000 Unit(s) SubCutaneous every 8 hours  insulin glargine Injectable (LANTUS) 20 Unit(s) SubCutaneous at bedtime  insulin lispro (ADMELOG) corrective regimen sliding scale   SubCutaneous three times a day before meals  insulin lispro Injectable (ADMELOG) 7 Unit(s) SubCutaneous three times a day before meals  lisinopril 10 milliGRAM(s) Oral daily  melatonin 3 milliGRAM(s) Oral at bedtime PRN  ondansetron Injectable 4 milliGRAM(s) IV Push every 8 hours PRN  tamsulosin 0.4 milliGRAM(s) Oral at bedtime  triamterene 37.5 mG/hydrochlorothiazide 25 mG Tablet 1 Tablet(s) Oral daily        Assessment and Plan:  Patient is a 75 y/o male with pmh of pre-DM, HTN, BPH presents to ED after hurting his right foot on Monday. Patient  went to [facility] in [place], had xrays done (which he says were negative), and given oral antibiotics. However, has had worsening pain and swelling, now with worsening bruising and inability to ambulate. Denies fevers, previous infections, loss of sensation.       #Right LE cellulitis with hematoma - SIRS was not present on admission   - CT Angio Lower Extremity w/ IV Cont, Right (07.02.22 @ 01:50): Diffuse subcutaneous soft tissue edema and areas of skin thickening  extending from the level of the distal femur through the foot with more   focal 5.0 x 1.57 m hyperdense attenuation over the dorsum mid to hindfoot  which may reflect underlying hematoma. Clinical correlation would be  needed to exclude any signs of cellulitis or infection. No definite acute osseous abnormality is identified.  - WBC Count: 5.24 K/uL (07.02.22 @ 00:26)  - appreciate burn evaluation - no surgical interventions, daily wound care .   -as per ID rec:  continue cefazolin 1g q 8 hours /- added doxycycline 100 mg BID   - Keep RLE elevated   - will plan PO Keflex 500 mg QID and Doxycycline 100 mg BID for 10 days on d/c   -pain management   -outpatient Burn team f/up     # DM - uncontrolled, A1c elevated  - at home on metformin 1000 qD   - now on glargine 20 units qhs , lispro 7 units with meals + SS (low)  - on d/c increase Metformin to 1000 mg po twice daily , add Januvia 100 mg po once daily    # HTN  - c/w home meds, lisinopril and HCTZ/triamterene     # HLD  - c/w home atorvastatin 10 at bed time     # BPH   - c/w home flomax and finasteride      DVT proph.     #Progress Note Handoff: patient was medically optimized, stable for  d/c home with  home care arrangement  Family discussion: Patient verbalized good understanding of discharge instructions and agreed with discharge plan.   Disposition: Home_ with home care today     Total time spent to complete patient's bedside assessment, review medical chart, discuss discharge  plan of care with covering medical team was more than 35 minutes

## 2022-07-07 NOTE — DISCHARGE NOTE NURSING/CASE MANAGEMENT/SOCIAL WORK - DATE OF LAST VACCINATION
15-Feb-2021 no incontinence/no hematuria/no renal colic/no dysuria/no bladder infections/no nocturia

## 2022-07-07 NOTE — DISCHARGE NOTE PROVIDER - NSDCFUSCHEDAPPT_GEN_ALL_CORE_FT
[facility] Physician [facility]  GERIATRICS [address]  Scheduled Appointment: 07/20/2022

## 2022-07-07 NOTE — DISCHARGE NOTE NURSING/CASE MANAGEMENT/SOCIAL WORK - NSDCPEFALRISK_GEN_ALL_CORE
For information on Fall & Injury Prevention, visit: [email or web address] OR  [email or web address] OR  https://www.cdc.gov/steadi/patient.html

## 2022-07-07 NOTE — DISCHARGE NOTE NURSING/CASE MANAGEMENT/SOCIAL WORK - PATIENT PORTAL LINK FT
You can access the FollowMyHealth Patient Portal offered by [facility] by registering at the following website: [email or web address]. By joining [employer]’s FollowMyHealth portal, you will also be able to view your health information using other applications (apps) compatible with our system.

## 2022-07-07 NOTE — DISCHARGE NOTE NURSING/CASE MANAGEMENT/SOCIAL WORK - NSDCVIVACCINE_GEN_ALL_CORE_FT
Tdap; 02-Jul-2022 01:35; Fidelia Springer (RN); [employer]; [id number] (Exp. Date: 11-Mar-2024); IntraMuscular; Deltoid Left.; 0.5 milliLiter(s); VIS (VIS Published: 09-May-2013, VIS Presented: 02-Jul-2022);

## 2022-07-07 NOTE — DISCHARGE NOTE PROVIDER - NSFOLLOWUPCLINICS_GEN_ALL_ED_FT
[facility]  Burn  [address], [address]  [place] [address]  Phone: [phone]  Fax:   Follow Up Time: Routine     [facility]  Burn  [address], [address]  [place] [address]  Phone: [phone]  Fax:   Follow Up Time: Routine    [facility]  Outpatient Clinic  [address], [place]   Phone: [phone]  Fax:      [facility]  Burn  [address], [address]  [place] [address]  Phone: [phone]  Fax:   Follow Up Time: Routine    [facility]  Outpatient Clinic  [address] [place], [place]   Phone: [phone]  Fax:   Scheduled Appointment: 7/20/2022 9:30 AM

## 2022-07-07 NOTE — DISCHARGE NOTE PROVIDER - CARE PROVIDER_API CALL
[address])  Internal Medicine  [address] [place] [id number]  Phone: [phone]  Fax: [phone]  Follow Up Time:    f/up with PCP as written on d/c document,   Phone: (   )    -  Fax: (   )    -  Follow Up Time:

## 2022-07-07 NOTE — DISCHARGE NOTE PROVIDER - NSDCMRMEDTOKEN_GEN_ALL_CORE_FT
finasteride 5 mg oral tablet: 1 tab(s) orally once a day  Flomax 0.4 mg oral capsule: 1 cap(s) orally once a day  Lipitor 10 mg oral tablet: 1 tab(s) orally once a day  lisinopril 10 mg oral tablet: 1 tab(s) orally once a day  metFORMIN 1000 mg oral tablet: 1 tab(s) orally 2 times a day  triamterene-hydrochlorothiazide 37.5 mg-25 mg oral capsule: 1 cap(s) orally once a day   finasteride 5 mg oral tablet: 1 tab(s) orally once a day  Flomax 0.4 mg oral capsule: 1 cap(s) orally once a day  Lipitor 10 mg oral tablet: 1 tab(s) orally once a day  lisinopril 10 mg oral tablet: 1 tab(s) orally once a day  triamterene-hydrochlorothiazide 37.5 mg-25 mg oral capsule: 1 cap(s) orally once a day   cephalexin 500 mg oral tablet: 1 tab(s) orally once a day   doxycycline monohydrate 100 mg oral capsule: 1 cap(s) orally 2 times a day   finasteride 5 mg oral tablet: 1 tab(s) orally once a day  Flomax 0.4 mg oral capsule: 1 cap(s) orally once a day  ibuprofen 400 mg oral tablet: 1 tab(s) orally every 6 hours, As Needed -for severe pain   Januvia 100 mg oral tablet: 1 tab(s) orally once a day   Lipitor 10 mg oral tablet: 1 tab(s) orally once a day  lisinopril 10 mg oral tablet: 1 tab(s) orally once a day  metFORMIN 1000 mg oral tablet: 1 tab(s) orally 2 times a day  triamterene-hydrochlorothiazide 37.5 mg-25 mg oral capsule: 1 cap(s) orally once a day

## 2022-07-07 NOTE — DISCHARGE NOTE PROVIDER - CARE PROVIDERS DIRECT ADDRESSES
[email or web address] ,[email or web address]

## 2022-07-07 NOTE — DISCHARGE NOTE PROVIDER - HOSPITAL COURSE
HPI    Patient is a 75 y/o male with pmh of pre-DM, HTN, BPH presents to ED after hurting his right foot on Monday (6/27). Patient  went to [facility] in [place], had xrays done (which he says were negative), and given oral antibiotics. However, has had worsening pain and swelling, now with worsening bruising and inability to ambulate. Denies fevers, previous infections, loss of sensation.     Hospital course:    Right LE cellulitis with hematoma - SIRS was not present on admission;  RLE was eval'd w/ CT Angio Lower Extremity w/ IV Cont, Right (07.02.22 @ 01:50) showing Diffuse subcutaneous soft tissue edema and areas of skin thickening  extending from the level of the distal femur through the foot with more focal 5.0 x 1.57 m hyperdense attenuation over the dorsum mid to hindfoot  which may reflect underlying hematoma. Clinical correlation would be  needed to exclude any signs of cellulitis or infection. No definite acute osseous abnormality is identified.  WBC Count: 5.24 K/uL (07.02.22 @ 00:26)   Pt was eval'd by Burn  - no surgical interventions, daily wound care .   Pt was eval'd by ID:  continue cefazolin 1g q 8 hours /- added doxycycline 100 mg BID  pt will be d/c'd PO Keflex 500 mg QID and Doxycycline 100 mg BID for 10 days on d/c     # DM - uncontrolled, A1c elevated to 8.2  - DM was managed w/ diet and b/b/ss insulin  - will be sent home on metformin 1000    # HTN  - c/w home meds, lisinopril and HCTZ/triamterene; no complications      # HLD  - c/w home atorvastatin 10 at bed time; no complications    # BPH   - c/w home flomax and finasteride; no complications    Pt will f/u w/ PCP; burn clinic; HPI    Patient is a 75 y/o male with pmh of pre-DM, HTN, BPH presents to ED after hurting his right foot on Monday (6/27). Patient  went to [facility] in [place], had xrays done (which he says were negative), and given oral antibiotics. However, has had worsening pain and swelling, now with worsening bruising and inability to ambulate. Denies fevers, previous infections, loss of sensation.     Hospital course:    Right LE cellulitis with hematoma - SIRS was not present on admission;  RLE was eval'd w/ CT Angio Lower Extremity w/ IV Cont, Right (07.02.22 @ 01:50) showing Diffuse subcutaneous soft tissue edema and areas of skin thickening  extending from the level of the distal femur through the foot with more focal 5.0 x 1.57 m hyperdense attenuation over the dorsum mid to hindfoot  which may reflect underlying hematoma. Clinical correlation would be  needed to exclude any signs of cellulitis or infection. No definite acute osseous abnormality is identified.  WBC Count: 5.24 K/uL (07.02.22 @ 00:26)   Pt was eval'd by Burn  - no surgical interventions, daily wound care .   Pt was eval'd by ID:  continue cefazolin 1g q 8 hours /- added doxycycline 100 mg BID  pt will be d/c'd PO Keflex 500 mg QID and Doxycycline 100 mg BID for 10 days on d/c   Pt will f/u burn clinic    # DM - uncontrolled, A1c elevated to 8.2  - DM was managed w/ diet and b/b/ss insulin  - will be sent home on metformin 1000 bid and will add januvia 100 qd    # HTN  - c/w home meds, lisinopril and HCTZ/triamterene; no complications      # HLD  - c/w home atorvastatin 10 at bed time; no complications    # BPH   - c/w home flomax and finasteride; no complications    Pt will f/u w/ PCP; burn clinic;

## 2022-07-07 NOTE — DISCHARGE NOTE NURSING/CASE MANAGEMENT/SOCIAL WORK - 
ADDITIONAL INFORMATION
-Using the intrrrepter [id number] [place] this nurse was told that patient do not know which brand of vaccine he had taken, He received three doses of the vaccine, the last was in february 2021. He does not have the card with him

## 2022-07-07 NOTE — PROGRESS NOTE ADULT - PROVIDER SPECIALTY LIST ADULT
Hospitalist
Internal Medicine
Physiatry
Burn
Hospitalist
Hospitalist
Infectious Disease
Hospitalist
Physiatry
Hospitalist

## 2022-07-07 NOTE — DISCHARGE NOTE PROVIDER - NSFOLLOWUPCLINICSTOKEN_GEN_ALL_ED_FT
149060:Routine|| ||00\01||False; [id number]:Routine|| ||00\01||False;[id number]: || ||00\01||False; [id number]:Routine|| ||00\01||False;[id number]: ||7/20/2022||09\30\00||False;

## 2022-07-08 LAB
CULTURE RESULTS: SIGNIFICANT CHANGE UP
SPECIMEN SOURCE: SIGNIFICANT CHANGE UP

## 2022-07-08 NOTE — CHART NOTE - NSCHARTNOTEFT_GEN_A_CORE
pt seen on 7/7 2022  Ambulating independently in room   No complaints   Scheduled for discharge     EXAM   awake alert with appropriate verbal responses   right LE - decreased swelling induration and ecchymosis   small clean open wound dorsum of foot and dry adherent eschar lateral distal leg     dressing changed     Continuing care - Bacitracin ointment after washing and dressing discussed with pt   Outpt f/u discussed   Concerns addressed

## 2022-07-10 RX ORDER — TAMSULOSIN HYDROCHLORIDE 0.4 MG/1
1 CAPSULE ORAL
Qty: 30 | Refills: 0
Start: 2022-07-10 | End: 2022-08-08

## 2022-07-10 RX ORDER — TRIAMTERENE/HYDROCHLOROTHIAZID 75 MG-50MG
1 TABLET ORAL
Qty: 30 | Refills: 0
Start: 2022-07-10 | End: 2022-08-08

## 2022-07-10 RX ORDER — ATORVASTATIN CALCIUM 80 MG/1
1 TABLET, FILM COATED ORAL
Qty: 30 | Refills: 0
Start: 2022-07-10 | End: 2022-08-08

## 2022-07-10 RX ORDER — FINASTERIDE 5 MG/1
1 TABLET, FILM COATED ORAL
Qty: 0 | Refills: 0 | DISCHARGE

## 2022-07-10 RX ORDER — TRIAMTERENE/HYDROCHLOROTHIAZID 75 MG-50MG
1 TABLET ORAL
Qty: 0 | Refills: 0 | DISCHARGE

## 2022-07-10 RX ORDER — TAMSULOSIN HYDROCHLORIDE 0.4 MG/1
1 CAPSULE ORAL
Qty: 0 | Refills: 0 | DISCHARGE

## 2022-07-10 RX ORDER — ATORVASTATIN CALCIUM 80 MG/1
1 TABLET, FILM COATED ORAL
Qty: 0 | Refills: 0 | DISCHARGE

## 2022-07-10 RX ORDER — LISINOPRIL 2.5 MG/1
1 TABLET ORAL
Qty: 30 | Refills: 0
Start: 2022-07-10 | End: 2022-08-08

## 2022-07-10 RX ORDER — FINASTERIDE 5 MG/1
1 TABLET, FILM COATED ORAL
Qty: 30 | Refills: 0
Start: 2022-07-10 | End: 2022-08-08

## 2022-07-10 RX ORDER — LISINOPRIL 2.5 MG/1
1 TABLET ORAL
Qty: 0 | Refills: 0 | DISCHARGE
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
## 2022-07-13 DIAGNOSIS — R23.8 OTHER SKIN CHANGES: ICD-10-CM

## 2022-07-13 DIAGNOSIS — L03.115 CELLULITIS OF RIGHT LOWER LIMB: ICD-10-CM

## 2022-07-13 DIAGNOSIS — I10 ESSENTIAL (PRIMARY) HYPERTENSION: ICD-10-CM

## 2022-07-13 DIAGNOSIS — S90.31XA CONTUSION OF RIGHT FOOT, INITIAL ENCOUNTER: ICD-10-CM

## 2022-07-13 DIAGNOSIS — E11.65 TYPE 2 DIABETES MELLITUS WITH HYPERGLYCEMIA: ICD-10-CM
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
## 2022-07-13 DIAGNOSIS — N40.0 BENIGN PROSTATIC HYPERPLASIA WITHOUT LOWER URINARY TRACT SYMPTOMS: ICD-10-CM
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
## 2022-07-21 ENCOUNTER — APPOINTMENT (OUTPATIENT)
Dept: BURN CARE | Facility: CLINIC | Age: 77
End: 2022-07-21

## 2022-07-21 ENCOUNTER — OUTPATIENT (OUTPATIENT)
Dept: OUTPATIENT SERVICES | Facility: HOSPITAL | Age: 77
LOS: 1 days | Discharge: HOME | End: 2022-07-21

## 2022-07-21 PROCEDURE — 97597 DBRDMT OPN WND 1ST 20 CM/<: CPT
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
## 2022-07-26 DIAGNOSIS — S91.001D UNSPECIFIED OPEN WOUND, RIGHT ANKLE, SUBSEQUENT ENCOUNTER: ICD-10-CM

## 2022-07-26 DIAGNOSIS — S91.302D UNSPECIFIED OPEN WOUND, LEFT FOOT, SUBSEQUENT ENCOUNTER: ICD-10-CM

## 2022-08-04 ENCOUNTER — OUTPATIENT (OUTPATIENT)
Dept: OUTPATIENT SERVICES | Facility: HOSPITAL | Age: 77
LOS: 1 days | Discharge: HOME | End: 2022-08-04

## 2022-08-04 ENCOUNTER — APPOINTMENT (OUTPATIENT)
Dept: GERIATRICS | Facility: CLINIC | Age: 77
End: 2022-08-04

## 2022-08-04 ENCOUNTER — NON-APPOINTMENT (OUTPATIENT)
Age: 77
End: 2022-08-04

## 2022-08-04 VITALS
TEMPERATURE: 98 F | SYSTOLIC BLOOD PRESSURE: 136 MMHG | HEIGHT: 62 IN | OXYGEN SATURATION: 97 % | HEART RATE: 77 BPM | WEIGHT: 163 LBS | BODY MASS INDEX: 30 KG/M2 | DIASTOLIC BLOOD PRESSURE: 87 MMHG

## 2022-08-04 DIAGNOSIS — R23.8 OTHER SPECIFIED SOFT TISSUE DISORDERS: ICD-10-CM

## 2022-08-04 DIAGNOSIS — M25.561 PAIN IN RIGHT KNEE: ICD-10-CM

## 2022-08-04 DIAGNOSIS — Z86.39 PERSONAL HISTORY OF OTHER ENDOCRINE, NUTRITIONAL AND METABOLIC DISEASE: ICD-10-CM

## 2022-08-04 DIAGNOSIS — M79.89 OTHER SPECIFIED SOFT TISSUE DISORDERS: ICD-10-CM

## 2022-08-04 DIAGNOSIS — Z86.79 PERSONAL HISTORY OF OTHER DISEASES OF THE CIRCULATORY SYSTEM: ICD-10-CM

## 2022-08-04 DIAGNOSIS — H91.90 UNSPECIFIED HEARING LOSS, UNSPECIFIED EAR: ICD-10-CM

## 2022-08-04 DIAGNOSIS — Z87.438 PERSONAL HISTORY OF OTHER DISEASES OF MALE GENITAL ORGANS: ICD-10-CM

## 2022-08-04 DIAGNOSIS — M25.562 PAIN IN RIGHT KNEE: ICD-10-CM

## 2022-08-04 PROCEDURE — 99203 OFFICE O/P NEW LOW 30 MIN: CPT

## 2022-08-04 RX ORDER — METFORMIN HYDROCHLORIDE 1000 MG/1
1000 TABLET, COATED ORAL
Qty: 60 | Refills: 2 | Status: ACTIVE | COMMUNITY
Start: 2022-08-04

## 2022-08-04 RX ORDER — SITAGLIPTIN 100 MG/1
100 TABLET, FILM COATED ORAL DAILY
Qty: 1 | Refills: 5 | Status: ACTIVE | COMMUNITY
Start: 2022-08-04

## 2022-08-04 RX ORDER — CIPROFLOXACIN HYDROCHLORIDE 500 MG/1
500 TABLET, FILM COATED ORAL
Refills: 0 | Status: DISCONTINUED | COMMUNITY
End: 2022-08-04

## 2022-08-04 RX ORDER — TRIAMTERENE AND HYDROCHLOROTHIAZIDE 37.5; 25 MG/1; MG/1
37.5-25 CAPSULE ORAL
Qty: 30 | Refills: 2 | Status: ACTIVE | COMMUNITY
Start: 2022-08-04

## 2022-08-04 RX ORDER — TAMSULOSIN HYDROCHLORIDE 0.4 MG/1
0.4 CAPSULE ORAL
Qty: 30 | Refills: 0 | Status: ACTIVE | COMMUNITY
Start: 2022-08-04

## 2022-08-04 RX ORDER — FINASTERIDE 5 MG/1
5 TABLET, FILM COATED ORAL DAILY
Qty: 30 | Refills: 1 | Status: ACTIVE | COMMUNITY
Start: 2022-08-04

## 2022-08-04 RX ORDER — METRONIDAZOLE 500 MG/1
500 TABLET ORAL
Refills: 0 | Status: DISCONTINUED | COMMUNITY
End: 2022-08-04

## 2022-08-04 NOTE — ASSESSMENT
[FreeTextEntry1] : 75yo M with PMHx HTN, DM, BPH, [place] presents to clinic today to establish care. He reports b/l lower extremity swelling and erythema with peeling off scan. He also reports some b/l knee pain which worsens with activity, not associated with stiffness. Pt's step-daughter requesting a wheelchair.\par \par #B/L lower extremity swelling \par - presents with swollen erythematous warm b/l lower foot\par - need to r/o PVD and cardiac causes\par - EVER and ECHO\par - Cardiology referral\par \par #HTN\par - /87 today\par - takes triamterene-HCTZ daily\par \par #DM\par - takes metformin and sitagliptin\par - need to see podiatry and opthal - referrals given\par - F/u a1c in next visit\par \par #BPH\par - takes finasteride and tamsulosin\par \par #[place]\par - audiology referral\par \par RTC in 6 weeks with blood work\par \par

## 2022-08-04 NOTE — REVIEW OF SYSTEMS
[Loss Of Hearing] : hearing loss [Lower Ext Edema] : lower extremity edema [Arthralgias] : arthralgias [Limb Swelling] : limb swelling [Fever] : no fever [Chills] : no chills [Eye Pain] : no eye pain [Red Eyes] : eyes not red [Earache] : no earache [Chest Pain] : no chest pain [Palpitations] : no palpitations [Leg Claudication] : no intermittent leg claudication [Shortness Of Breath] : no shortness of breath [Wheezing] : no wheezing [Cough] : no cough [Abdominal Pain] : no abdominal pain [Vomiting] : no vomiting [Constipation] : no constipation [Diarrhea] : no diarrhea [Dysuria] : no dysuria [Joint Stiffness] : no joint stiffness

## 2022-08-04 NOTE — HISTORY OF PRESENT ILLNESS
[FreeTextEntry1] : 75yo M with PMHx HTN, DM, BPH, [place] presents to clinic today to establish care. He reports b/l lower extremity swelling and erythema with peeling off scan. He also reports some b/l knee pain which worsens with activity, not associated with stiffness. Pt's step-daughter requesting a wheelchair. Denies chest pain, SOB, abd pain, headaches, change in bowel/bladder habits

## 2022-08-04 NOTE — PHYSICAL EXAM
[Alert] : alert [No Acute Distress] : in no acute distress [Sclera] : the sclera and conjunctiva were normal [EOMI] : extraocular movements were intact [Normal Appearance] : the appearance of the neck was normal [Supple] : the neck was supple [No Respiratory Distress] : no respiratory distress [Auscultation Breath Sounds / Voice Sounds] : lungs were clear to auscultation bilaterally [Normal S1, S2] : normal S1 and S2 [Heart Rate And Rhythm] : heart rate was normal and rhythm regular [Bowel Sounds] : normal bowel sounds [Abdomen Tenderness] : non-tender [Abdomen Soft] : soft [No Spinal Tenderness] : no spinal tenderness [No Focal Deficits] : no focal deficits [Normal Affect] : the affect was normal [Normal Mood] : the mood was normal [de-identified] : erythematous swollen b/l lower extremities

## 2022-08-04 NOTE — END OF VISIT
[] : Resident [FreeTextEntry3] : SEEN WITH KELLIE TEAM\par Here to establish care; is [place] diabetic; denies cardiac disease\par On PE, significant bilatateral LE edema (kpitting) and onychomycosis of toenails; no JVD or rales\par 1. labs including urine; also optho and podiatry for diabetic patient.\par 2. echo and cardiac eval\par 3. lower extrem brachial studies\par No change of meds yet; RTC 6 weeks

## 2022-08-09 DIAGNOSIS — M79.89 OTHER SPECIFIED SOFT TISSUE DISORDERS: ICD-10-CM

## 2022-08-09 DIAGNOSIS — Z87.438 PERSONAL HISTORY OF OTHER DISEASES OF MALE GENITAL ORGANS: ICD-10-CM

## 2022-08-09 DIAGNOSIS — Z86.79 PERSONAL HISTORY OF OTHER DISEASES OF THE CIRCULATORY SYSTEM: ICD-10-CM

## 2022-08-09 DIAGNOSIS — H91.90 UNSPECIFIED HEARING LOSS, UNSPECIFIED EAR: ICD-10-CM

## 2022-08-09 DIAGNOSIS — M25.561 PAIN IN RIGHT KNEE: ICD-10-CM

## 2022-08-09 DIAGNOSIS — Z86.39 PERSONAL HISTORY OF OTHER ENDOCRINE, NUTRITIONAL AND METABOLIC DISEASE: ICD-10-CM

## 2022-08-15 ENCOUNTER — APPOINTMENT (OUTPATIENT)
Dept: SPEECH THERAPY | Facility: CLINIC | Age: 77
End: 2022-08-15

## 2022-08-16 ENCOUNTER — OUTPATIENT (OUTPATIENT)
Dept: OUTPATIENT SERVICES | Facility: HOSPITAL | Age: 77
LOS: 1 days | Discharge: HOME | End: 2022-08-16

## 2022-08-16 ENCOUNTER — APPOINTMENT (OUTPATIENT)
Dept: PODIATRY | Facility: CLINIC | Age: 77
End: 2022-08-16

## 2022-08-16 PROCEDURE — 99203 OFFICE O/P NEW LOW 30 MIN: CPT | Mod: 25

## 2022-08-16 PROCEDURE — 11721 DEBRIDE NAIL 6 OR MORE: CPT

## 2022-08-16 NOTE — ASSESSMENT
[FreeTextEntry1] : - pt seen and evaluated.\par - Educated on proper foot care\par - Nails Debx X 10.\par - continue using Aquaphor cream for xerosis. \par - Tight blood sugar control recommended. \par - RTC in 3 months [Verbal] : verbal [Patient] : patient [Other: ___] : [unfilled] [Good - alert, interested, motivated] : Good - alert, interested, motivated [Foot Care] : foot care [Arterial Disease] : arterial disease [Glycemic Control] : glycemic control

## 2022-08-16 NOTE — HISTORY OF PRESENT ILLNESS
[FreeTextEntry1] : Comes to the office with dressing around is R foot - done by VNS\par VNS stopped coming because there was not wound present anymore\par Difficulty hearing\par A1c 7.9% \par

## 2022-08-16 NOTE — PHYSICAL EXAM
[1+] : left foot posterior tibialis 1+ [2+] : left foot dorsalis pedis 2+ [Vibration Dec.] : diminished vibratory sensation at the level of the toes [Oriented To Time, Place, And Person] : oriented to person, place, and time [Ankle Swelling (On Exam)] : present [Ankle Swelling Bilaterally] : bilaterally  [Ankle Swelling On The Right] : mild [Delayed in the Right Toes] : capillary refills normal in right toes [Delayed in the Left Toes] : capillary refills normal in the left toes [] : normal strength/tone [FreeTextEntry1] : elongated, dystrophic nails x 10\par Moderate xerosis B/L feet  [Position Sense Dec.] : normal position sense at the level of the toes [Diminished Throughout Right Foot] : normal sensation with monofilament testing throughout right foot [Diminished Throughout Left Foot] : normal sensation with monofilament testing throughout left foot

## 2022-08-17 DIAGNOSIS — L85.3 XEROSIS CUTIS: ICD-10-CM

## 2022-08-17 DIAGNOSIS — B35.1 TINEA UNGUIUM: ICD-10-CM

## 2022-08-17 DIAGNOSIS — L60.3 NAIL DYSTROPHY: ICD-10-CM

## 2022-08-17 DIAGNOSIS — E11.42 TYPE 2 DIABETES MELLITUS WITH DIABETIC POLYNEUROPATHY: ICD-10-CM

## 2022-08-18 LAB
25(OH)D3 SERPL-MCNC: 23 NG/ML
ALBUMIN SERPL ELPH-MCNC: 5.1 G/DL
ALP BLD-CCNC: 121 U/L
ALT SERPL-CCNC: 16 U/L
ANION GAP SERPL CALC-SCNC: 15 MMOL/L
AST SERPL-CCNC: 17 U/L
BASOPHILS # BLD AUTO: 0.09 K/UL
BASOPHILS NFR BLD AUTO: 1.2 %
BILIRUB SERPL-MCNC: 0.6 MG/DL
BUN SERPL-MCNC: 17 MG/DL
CALCIUM SERPL-MCNC: 9.7 MG/DL
CHLORIDE SERPL-SCNC: 94 MMOL/L
CHOLEST SERPL-MCNC: 160 MG/DL
CO2 SERPL-SCNC: 28 MMOL/L
CREAT SERPL-MCNC: 0.8 MG/DL
CREAT SPEC-SCNC: 211 MG/DL
EGFR: 92 ML/MIN/1.73M2
EOSINOPHIL # BLD AUTO: 0.14 K/UL
EOSINOPHIL NFR BLD AUTO: 1.9 %
ESTIMATED AVERAGE GLUCOSE: 180 MG/DL
GLUCOSE SERPL-MCNC: 164 MG/DL
HBA1C MFR BLD HPLC: 7.9 %
HCT VFR BLD CALC: 45.6 %
HDLC SERPL-MCNC: 49 MG/DL
HGB BLD-MCNC: 15.1 G/DL
IMM GRANULOCYTES NFR BLD AUTO: 1.5 %
LDLC SERPL CALC-MCNC: 76 MG/DL
LYMPHOCYTES # BLD AUTO: 1.57 K/UL
LYMPHOCYTES NFR BLD AUTO: 21.1 %
MAN DIFF?: NORMAL
MCHC RBC-ENTMCNC: 33 PG
MCHC RBC-ENTMCNC: 33.1 G/DL
MCV RBC AUTO: 99.8 FL
MICROALBUMIN 24H UR DL<=1MG/L-MCNC: 7.3 MG/DL
MICROALBUMIN/CREAT 24H UR-RTO: 34 MG/G
MONOCYTES # BLD AUTO: 0.91 K/UL
MONOCYTES NFR BLD AUTO: 12.2 %
NEUTROPHILS # BLD AUTO: 4.61 K/UL
NEUTROPHILS NFR BLD AUTO: 62.1 %
NONHDLC SERPL-MCNC: 111 MG/DL
PLATELET # BLD AUTO: 246 K/UL
POTASSIUM SERPL-SCNC: 3.6 MMOL/L
PROT SERPL-MCNC: 8.2 G/DL
RBC # BLD: 4.57 M/UL
RBC # FLD: 12.5 %
SODIUM SERPL-SCNC: 137 MMOL/L
TRIGL SERPL-MCNC: 177 MG/DL
TSH SERPL-ACNC: 2.81 UIU/ML
WBC # FLD AUTO: 7.43 K/UL

## 2022-08-22 ENCOUNTER — APPOINTMENT (OUTPATIENT)
Dept: SPEECH THERAPY | Facility: CLINIC | Age: 77
End: 2022-08-22

## 2022-08-22 ENCOUNTER — OUTPATIENT (OUTPATIENT)
Dept: OUTPATIENT SERVICES | Facility: HOSPITAL | Age: 77
LOS: 1 days | Discharge: HOME | End: 2022-08-22

## 2022-08-22 DIAGNOSIS — H90.8 MIXED CONDUCTIVE AND SENSORINEURAL HEARING LOSS, UNSPECIFIED: ICD-10-CM

## 2022-11-15 ENCOUNTER — APPOINTMENT (OUTPATIENT)
Dept: PODIATRY | Facility: CLINIC | Age: 77
End: 2022-11-15

## 2022-11-15 ENCOUNTER — OUTPATIENT (OUTPATIENT)
Dept: OUTPATIENT SERVICES | Facility: HOSPITAL | Age: 77
LOS: 1 days | Discharge: HOME | End: 2022-11-15

## 2022-11-15 PROCEDURE — 99213 OFFICE O/P EST LOW 20 MIN: CPT

## 2022-11-15 NOTE — PHYSICAL EXAM
[Ankle Swelling (On Exam)] : present [Ankle Swelling Bilaterally] : bilaterally  [Ankle Swelling On The Right] : mild [Delayed in the Right Toes] : capillary refills normal in right toes [Delayed in the Left Toes] : capillary refills normal in the left toes [1+] : left foot posterior tibialis 1+ [2+] : left foot dorsalis pedis 2+ [] : normal strength/tone [FreeTextEntry1] : Moderate xerosis B/L feet  [Vibration Dec.] : diminished vibratory sensation at the level of the toes [Position Sense Dec.] : normal position sense at the level of the toes [Diminished Throughout Right Foot] : normal sensation with monofilament testing throughout right foot [Diminished Throughout Left Foot] : normal sensation with monofilament testing throughout left foot [Oriented To Time, Place, And Person] : oriented to person, place, and time

## 2022-11-15 NOTE — ASSESSMENT
[FreeTextEntry1] : - pt seen and evaluated.\par - Educated on proper foot care\par - Rx Amlactin \par - Tight blood sugar control recommended. \par - RTC in 6 months [Verbal] : verbal [Patient] : patient [Other: ___] : [unfilled] [Good - alert, interested, motivated] : Good - alert, interested, motivated [Foot Care] : foot care [Arterial Disease] : arterial disease [Glycemic Control] : glycemic control

## 2023-05-16 ENCOUNTER — OUTPATIENT (OUTPATIENT)
Dept: OUTPATIENT SERVICES | Facility: HOSPITAL | Age: 78
LOS: 1 days | End: 2023-05-16
Payer: MEDICARE

## 2023-05-16 ENCOUNTER — APPOINTMENT (OUTPATIENT)
Dept: PODIATRY | Facility: CLINIC | Age: 78
End: 2023-05-16
Payer: MEDICARE

## 2023-05-16 ENCOUNTER — APPOINTMENT (OUTPATIENT)
Dept: PODIATRY | Facility: CLINIC | Age: 78
End: 2023-05-16

## 2023-05-16 DIAGNOSIS — Z00.00 ENCOUNTER FOR GENERAL ADULT MEDICAL EXAMINATION WITHOUT ABNORMAL FINDINGS: ICD-10-CM

## 2023-05-16 PROCEDURE — 11721 DEBRIDE NAIL 6 OR MORE: CPT

## 2023-05-16 PROCEDURE — 99213 OFFICE O/P EST LOW 20 MIN: CPT | Mod: 25

## 2023-05-16 NOTE — ASSESSMENT
[Verbal] : verbal [Patient] : patient [Other: ___] : [unfilled] [Good - alert, interested, motivated] : Good - alert, interested, motivated [Foot Care] : foot care [Arterial Disease] : arterial disease [Glycemic Control] : glycemic control [FreeTextEntry1] : Assessment:\par -Diabetes mellitus\par -Tinea pedis\par -Onychomycosis\par \par Plan:\par - Pt seen and evaluated.\par - Educated on proper foot care\par - Continue Amlactin \par - Tight blood sugar control recommended\par - Fungal & moisturizing creams from [facility] dispensed to pt\par - Dbx of nails x10\par - RTC 1 year or PRN

## 2023-05-16 NOTE — HISTORY OF PRESENT ILLNESS
[Sneakers] : mandy [FreeTextEntry1] : CC: "Check my feet"\par HPI:\par -77M presents to clinic for 6 month diabetic foot eval\par -Difficulty hearing\par -A1c 7.9% 8/12/22\par -Uses Amlactin at night; it is helping\par -No new pedal complaints\par

## 2023-05-16 NOTE — PHYSICAL EXAM
[Ankle Swelling Bilaterally] : bilaterally  [1+] : left foot posterior tibialis 1+ [2+] : left foot dorsalis pedis 2+ [Vibration Dec.] : diminished vibratory sensation at the level of the toes [Oriented To Time, Place, And Person] : oriented to person, place, and time [No Joint Swelling] : no joint swelling [Ankle Swelling (On Exam)] : not present [Varicose Veins Of Lower Extremities] : not present [] : not present [Delayed in the Right Toes] : capillary refills normal in right toes [Delayed in the Left Toes] : capillary refills normal in the left toes [de-identified] : Mild hammering toes 2-5, b/l [FreeTextEntry1] : Moderate xerosis w/moccasin distribution, B/L feet\par Nails thick & dystrophic w/subungual debris x10 [Position Sense Dec.] : normal position sense at the level of the toes [Diminished Throughout Right Foot] : normal sensation with monofilament testing throughout right foot [Diminished Throughout Left Foot] : normal sensation with monofilament testing throughout left foot

## 2023-05-23 DIAGNOSIS — B35.1 TINEA UNGUIUM: ICD-10-CM

## 2023-05-23 DIAGNOSIS — L60.3 NAIL DYSTROPHY: ICD-10-CM

## 2023-05-23 DIAGNOSIS — E11.42 TYPE 2 DIABETES MELLITUS WITH DIABETIC POLYNEUROPATHY: ICD-10-CM

## 2023-05-23 DIAGNOSIS — L85.3 XEROSIS CUTIS: ICD-10-CM

## 2024-05-21 ENCOUNTER — OUTPATIENT (OUTPATIENT)
Dept: OUTPATIENT SERVICES | Facility: HOSPITAL | Age: 79
LOS: 1 days | End: 2024-05-21
Payer: MEDICARE

## 2024-05-21 ENCOUNTER — APPOINTMENT (OUTPATIENT)
Dept: PODIATRY | Facility: CLINIC | Age: 79
End: 2024-05-21
Payer: MEDICARE

## 2024-05-21 DIAGNOSIS — L85.3 XEROSIS CUTIS: ICD-10-CM

## 2024-05-21 DIAGNOSIS — B35.1 TINEA UNGUIUM: ICD-10-CM

## 2024-05-21 DIAGNOSIS — Z00.00 ENCOUNTER FOR GENERAL ADULT MEDICAL EXAMINATION WITHOUT ABNORMAL FINDINGS: ICD-10-CM

## 2024-05-21 DIAGNOSIS — L60.3 NAIL DYSTROPHY: ICD-10-CM

## 2024-05-21 DIAGNOSIS — E11.42 TYPE 2 DIABETES MELLITUS WITH DIABETIC POLYNEUROPATHY: ICD-10-CM

## 2024-05-21 PROCEDURE — 11721 DEBRIDE NAIL 6 OR MORE: CPT

## 2024-05-21 PROCEDURE — 99213 OFFICE O/P EST LOW 20 MIN: CPT | Mod: 25

## 2024-05-21 RX ORDER — AMMONIUM LACTATE 12 %
12 LOTION (GRAM) TOPICAL TWICE DAILY
Qty: 1 | Refills: 2 | Status: ACTIVE | COMMUNITY
Start: 2022-11-15 | End: 1900-01-01

## 2024-05-21 NOTE — PHYSICAL EXAM
[Ankle Swelling (On Exam)] : not present [Ankle Swelling Bilaterally] : bilaterally  [Varicose Veins Of Lower Extremities] : bilaterally [Delayed in the Right Toes] : capillary refills normal in right toes [Delayed in the Left Toes] : capillary refills normal in the left toes [1+] : left foot posterior tibialis 1+ [2+] : left foot dorsalis pedis 2+ [No Joint Swelling] : no joint swelling [] : normal strength/tone [de-identified] : Mild hammering toes 2-5, b/l [FreeTextEntry1] : Moderate xerosis w/moccasin distribution, B/L feet\par  Nails thick & dystrophic w/subungual debris x10 [Vibration Dec.] : diminished vibratory sensation at the level of the toes [Position Sense Dec.] : normal position sense at the level of the toes [Diminished Throughout Right Foot] : normal sensation with monofilament testing throughout right foot [Diminished Throughout Left Foot] : normal sensation with monofilament testing throughout left foot [Oriented To Time, Place, And Person] : oriented to person, place, and time

## 2024-05-21 NOTE — HISTORY OF PRESENT ILLNESS
[Sneakers] : mandy [FreeTextEntry1] : CC: "Check my feet" HPI: -78M presents to clinic for 6 month diabetic foot eval -Difficulty hearing -A1c 7.9% 8/12/22 -Uses Amlactin at night; it is helping -No new pedal complaints

## 2024-05-21 NOTE — ASSESSMENT
[FreeTextEntry1] : Assessment: -Diabetes mellitus -Tinea pedis -Onychomycosis  Plan: - Pt seen and evaluated. - Educated on proper foot care - Continue Amlactin  - Tight blood sugar control recommended - Fungal & moisturizing creams from [facility] dispensed to pt - Dbx of nails x10 - RTC 1 year or PRN [Verbal] : verbal [Patient] : patient [Other: ___] : [unfilled] [Good - alert, interested, motivated] : Good - alert, interested, motivated [Foot Care] : foot care [Arterial Disease] : arterial disease [Glycemic Control] : glycemic control

## 2024-05-29 DIAGNOSIS — L60.3 NAIL DYSTROPHY: ICD-10-CM

## 2024-05-29 DIAGNOSIS — B35.1 TINEA UNGUIUM: ICD-10-CM
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
## 2024-05-29 DIAGNOSIS — E11.42 TYPE 2 DIABETES MELLITUS WITH DIABETIC POLYNEUROPATHY: ICD-10-CM
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
## 2024-05-29 DIAGNOSIS — L85.3 XEROSIS CUTIS: ICD-10-CM
